# Patient Record
Sex: FEMALE
[De-identification: names, ages, dates, MRNs, and addresses within clinical notes are randomized per-mention and may not be internally consistent; named-entity substitution may affect disease eponyms.]

---

## 2022-12-07 PROBLEM — Z00.00 ENCOUNTER FOR PREVENTIVE HEALTH EXAMINATION: Status: ACTIVE | Noted: 2022-12-07

## 2022-12-15 ENCOUNTER — NON-APPOINTMENT (OUTPATIENT)
Age: 82
End: 2022-12-15

## 2022-12-15 ENCOUNTER — APPOINTMENT (OUTPATIENT)
Dept: COLORECTAL SURGERY | Facility: CLINIC | Age: 82
End: 2022-12-15

## 2022-12-15 VITALS
WEIGHT: 135 LBS | HEIGHT: 61 IN | BODY MASS INDEX: 25.49 KG/M2 | SYSTOLIC BLOOD PRESSURE: 167 MMHG | HEART RATE: 72 BPM | DIASTOLIC BLOOD PRESSURE: 76 MMHG

## 2022-12-15 DIAGNOSIS — Z82.5 FAMILY HISTORY OF ASTHMA AND OTHER CHRONIC LOWER RESPIRATORY DISEASES: ICD-10-CM

## 2022-12-15 DIAGNOSIS — Z80.3 FAMILY HISTORY OF MALIGNANT NEOPLASM OF BREAST: ICD-10-CM

## 2022-12-15 DIAGNOSIS — I10 ESSENTIAL (PRIMARY) HYPERTENSION: ICD-10-CM

## 2022-12-15 DIAGNOSIS — Z78.9 OTHER SPECIFIED HEALTH STATUS: ICD-10-CM

## 2022-12-15 PROCEDURE — 99203 OFFICE O/P NEW LOW 30 MIN: CPT | Mod: 25

## 2022-12-15 PROCEDURE — 46221 LIGATION OF HEMORRHOID(S): CPT

## 2022-12-15 RX ORDER — METHENAMINE HIPPURATE 1 G/1
TABLET ORAL
Refills: 0 | Status: ACTIVE | COMMUNITY

## 2022-12-15 RX ORDER — VIT A/VIT C/VIT E/ZINC/COPPER 4296-226
CAPSULE ORAL
Refills: 0 | Status: ACTIVE | COMMUNITY

## 2022-12-15 RX ORDER — ESTRADIOL 0.1 MG/G
CREAM VAGINAL
Refills: 0 | Status: ACTIVE | COMMUNITY

## 2022-12-15 RX ORDER — ATORVASTATIN CALCIUM 80 MG/1
TABLET, FILM COATED ORAL
Refills: 0 | Status: ACTIVE | COMMUNITY

## 2022-12-15 RX ORDER — LATANOPROST/PF 0.005 %
0.01 DROPS OPHTHALMIC (EYE)
Refills: 0 | Status: ACTIVE | COMMUNITY

## 2022-12-15 RX ORDER — DILTIAZEM HYDROCHLORIDE 300 MG/1
300 CAPSULE, EXTENDED RELEASE ORAL
Refills: 0 | Status: ACTIVE | COMMUNITY

## 2022-12-15 NOTE — CONSULT LETTER
[Dear  ___] : Dear  [unfilled], [Consult Letter:] : I had the pleasure of evaluating your patient, [unfilled]. [Please see my note below.] : Please see my note below. [Consult Closing:] : Thank you very much for allowing me to participate in the care of this patient.  If you have any questions, please do not hesitate to contact me. [Sincerely,] : Sincerely, [FreeTextEntry3] : Clarence Inman MD\par

## 2022-12-15 NOTE — HISTORY OF PRESENT ILLNESS
[FreeTextEntry1] : 81-year-old female who presents for consultation for prolapsing hemorrhoids.  She has had hemorrhoidal symptoms for several years since vaginal delivery she has been mostly asymptomatic from her hemorrhoids up until the last few months.  She noticed prolapsing rectal tissue and occasional bleeding when she wipes.  She has regular bowel movements without difficulty.  She has no rectal pain.  She has some itching in the area as well as discharge and moisture in the anal region from her hemorrhoids.  She has been suffering from recurrent urinary tract infections over the past 4 months and was seen by urologist.  Cystogram and CT scan were unremarkable for the source of her urinary tract infection but a kidney lesion was found that will be monitored.  She has had several colonoscopies most recently in 2016 which showed hemorrhoids and diverticulosis.

## 2022-12-15 NOTE — PHYSICAL EXAM
[Excoriation] : no perianal excoriation [Lax] : was lax [None] : there was no rectal mass  [Respiratory Effort] : normal respiratory effort [Normal Rate and Rhythm] : normal rate and rhythm [Calm] : calm [de-identified] : Soft, nontender, nondistended.  No mass or hernias appreciated. [de-identified] : Prolapsing internal hemorrhoids [de-identified] : Nonthrombosed external hemorrhoids [de-identified] : Well-appearing, in no distress [de-identified] : Normocephalic, atraumatic [de-identified] : Moves extremities without difficulty [de-identified] : Warm and dry [de-identified] : Alert and oriented x3

## 2022-12-15 NOTE — PROCEDURE
[FreeTextEntry1] : Risks and benefits of hemorrhoid banding was reviewed.  Anterior hemorrhoid was banded x2 above the dentate line.  She tolerated the procedure well.

## 2023-01-12 ENCOUNTER — APPOINTMENT (OUTPATIENT)
Dept: COLORECTAL SURGERY | Facility: CLINIC | Age: 83
End: 2023-01-12
Payer: MEDICARE

## 2023-01-12 VITALS
SYSTOLIC BLOOD PRESSURE: 167 MMHG | DIASTOLIC BLOOD PRESSURE: 75 MMHG | TEMPERATURE: 97 F | HEIGHT: 61 IN | WEIGHT: 135 LBS | BODY MASS INDEX: 25.49 KG/M2 | HEART RATE: 80 BPM | RESPIRATION RATE: 15 BRPM

## 2023-01-12 DIAGNOSIS — K64.8 OTHER HEMORRHOIDS: ICD-10-CM

## 2023-01-12 PROCEDURE — 46600 DIAGNOSTIC ANOSCOPY SPX: CPT

## 2023-01-12 PROCEDURE — 99212 OFFICE O/P EST SF 10 MIN: CPT | Mod: 25

## 2023-01-14 PROBLEM — K64.8 INTERNAL PROLAPSED HEMORRHOIDS: Status: ACTIVE | Noted: 2022-12-15

## 2023-01-14 NOTE — PHYSICAL EXAM
[Excoriation] : no perianal excoriation [Lax] : was lax [None] : there was no rectal mass  [Respiratory Effort] : normal respiratory effort [Calm] : calm [de-identified] : Grade 2 internal hemorrhoids [de-identified] : Nonthrombosed external hemorrhoids [de-identified] : Normocephalic, atraumatic [de-identified] : Well-appearing, in no distress [de-identified] : Moves extremities without difficulty [de-identified] : Warm and dry [de-identified] : Alert and oriented x3

## 2023-01-14 NOTE — HISTORY OF PRESENT ILLNESS
[FreeTextEntry1] : 81-year-old female who is here for follow up, patient with a history of prolapsing hemorrhoids. \par She has hemorrhoidal banding on last visit. Today, she reports having no issues, she is very satisfied with the results, states she had no more soiling, and didn't develop in UTI since.\par \par She has had several colonoscopies most recently in 2016 which showed hemorrhoids and diverticulosis. \par

## 2023-02-23 ENCOUNTER — OFFICE (OUTPATIENT)
Dept: URBAN - METROPOLITAN AREA CLINIC 109 | Facility: CLINIC | Age: 83
Setting detail: OPHTHALMOLOGY
End: 2023-02-23
Payer: MEDICARE

## 2023-02-23 DIAGNOSIS — H02.834: ICD-10-CM

## 2023-02-23 DIAGNOSIS — G43.109: ICD-10-CM

## 2023-02-23 DIAGNOSIS — H43.391: ICD-10-CM

## 2023-02-23 DIAGNOSIS — H16.221: ICD-10-CM

## 2023-02-23 DIAGNOSIS — Z96.1: ICD-10-CM

## 2023-02-23 DIAGNOSIS — H43.392: ICD-10-CM

## 2023-02-23 DIAGNOSIS — H40.1132: ICD-10-CM

## 2023-02-23 DIAGNOSIS — H02.832: ICD-10-CM

## 2023-02-23 DIAGNOSIS — H02.835: ICD-10-CM

## 2023-02-23 DIAGNOSIS — H02.831: ICD-10-CM

## 2023-02-23 DIAGNOSIS — H35.3131: ICD-10-CM

## 2023-02-23 PROCEDURE — 92014 COMPRE OPH EXAM EST PT 1/>: CPT | Performed by: OPHTHALMOLOGY

## 2023-02-23 PROCEDURE — 92020 GONIOSCOPY: CPT | Performed by: OPHTHALMOLOGY

## 2023-02-23 PROCEDURE — 92133 CPTRZD OPH DX IMG PST SGM ON: CPT | Performed by: OPHTHALMOLOGY

## 2023-02-23 PROCEDURE — 92250 FUNDUS PHOTOGRAPHY W/I&R: CPT | Performed by: OPHTHALMOLOGY

## 2023-02-23 ASSESSMENT — REFRACTION_AUTOREFRACTION
OD_SPHERE: +0.25
OS_SPHERE: -0.25
OD_AXIS: 117
OD_CYLINDER: -1.00

## 2023-02-23 ASSESSMENT — REFRACTION_MANIFEST
OS_CYLINDER: -0.50
OD_SPHERE: +2.50
OS_AXIS: 85
OD_VA1: 20/30+2
OS_AXIS: 165
OD_AXIS: 96
OD_SPHERE: -4.75
OS_VA1: 20/30+2
OS_SPHERE: +2.75
OD_CYLINDER: -0.50
OS_CYLINDER: -1.25
OS_SPHERE: -4.75

## 2023-02-23 ASSESSMENT — KERATOMETRY
OD_K2POWER_DIOPTERS: 45.75
OS_K1POWER_DIOPTERS: 45.50
OS_AXISANGLE_DEGREES: 6
OS_K2POWER_DIOPTERS: 45.87
OD_AXISANGLE_DEGREES: 106
OD_K1POWER_DIOPTERS: 45.37

## 2023-02-23 ASSESSMENT — MACULA - RETINAL PIGMENT EPITHELIAL CHANGES
OS_RPE_CHANGES: T
OD_RPE_CHANGES: T

## 2023-02-23 ASSESSMENT — CONFRONTATIONAL VISUAL FIELD TEST (CVF)
OS_FINDINGS: FULL
OD_FINDINGS: FULL

## 2023-02-23 ASSESSMENT — SPHEQUIV_DERIVED
OS_SPHEQUIV: -5.375
OD_SPHEQUIV: -0.25
OD_SPHEQUIV: 2.25
OS_SPHEQUIV: 2.5

## 2023-02-23 ASSESSMENT — LID POSITION - DERMATOCHALASIS
OS_DERMATOCHALASIS: 1+ 2+
OD_DERMATOCHALASIS: 1+ 2+

## 2023-02-23 ASSESSMENT — REFRACTION_CURRENTRX
OS_AXIS: 165
OS_CYLINDER: -1.25
OD_VPRISM_DIRECTION: SV
OD_OVR_VA: 20/
OS_SPHERE: -3.25
OS_VPRISM_DIRECTION: SV
OS_OVR_VA: 20/
OD_OVR_VA: 20/
OD_SPHERE: -4.25
OS_OVR_VA: 20/

## 2023-02-23 ASSESSMENT — AXIALLENGTH_DERIVED
OS_AL: 21.94
OD_AL: 22.0644
OS_AL: 24.96
OD_AL: 22.9506

## 2023-02-23 ASSESSMENT — VISUAL ACUITY
OD_BCVA: 20/25+3
OS_BCVA: 20/25-2

## 2023-02-23 ASSESSMENT — SUPERFICIAL PUNCTATE KERATITIS (SPK)
OD_SPK: ABSENT
OS_SPK: ABSENT

## 2023-02-23 ASSESSMENT — MACULA - DRUSEN
OD_DRUSEN: 2+
OS_DRUSEN: 2+

## 2023-06-21 ENCOUNTER — OFFICE (OUTPATIENT)
Dept: URBAN - METROPOLITAN AREA CLINIC 109 | Facility: CLINIC | Age: 83
Setting detail: OPHTHALMOLOGY
End: 2023-06-21
Payer: MEDICARE

## 2023-06-21 DIAGNOSIS — H02.831: ICD-10-CM

## 2023-06-21 DIAGNOSIS — H02.834: ICD-10-CM

## 2023-06-21 DIAGNOSIS — Z96.1: ICD-10-CM

## 2023-06-21 DIAGNOSIS — H43.392: ICD-10-CM

## 2023-06-21 DIAGNOSIS — H02.835: ICD-10-CM

## 2023-06-21 DIAGNOSIS — H43.391: ICD-10-CM

## 2023-06-21 DIAGNOSIS — H35.3131: ICD-10-CM

## 2023-06-21 DIAGNOSIS — H16.221: ICD-10-CM

## 2023-06-21 DIAGNOSIS — H40.1132: ICD-10-CM

## 2023-06-21 DIAGNOSIS — H02.832: ICD-10-CM

## 2023-06-21 DIAGNOSIS — G43.109: ICD-10-CM

## 2023-06-21 PROCEDURE — 92083 EXTENDED VISUAL FIELD XM: CPT | Performed by: OPHTHALMOLOGY

## 2023-06-21 PROCEDURE — 99213 OFFICE O/P EST LOW 20 MIN: CPT | Performed by: OPHTHALMOLOGY

## 2023-06-21 ASSESSMENT — REFRACTION_MANIFEST
OD_CYLINDER: -0.50
OS_AXIS: 165
OS_SPHERE: -4.75
OD_AXIS: 96
OD_VA1: 20/30+2
OD_SPHERE: +2.50
OS_AXIS: 85
OS_SPHERE: +2.75
OS_VA1: 20/30+2
OD_SPHERE: -4.75
OS_CYLINDER: -1.25
OS_CYLINDER: -0.50

## 2023-06-21 ASSESSMENT — SUPERFICIAL PUNCTATE KERATITIS (SPK)
OS_SPK: ABSENT
OD_SPK: ABSENT

## 2023-06-21 ASSESSMENT — REFRACTION_CURRENTRX
OS_OVR_VA: 20/
OS_VPRISM_DIRECTION: SV
OD_OVR_VA: 20/
OS_SPHERE: -3.25
OD_OVR_VA: 20/
OS_AXIS: 165
OS_OVR_VA: 20/
OD_VPRISM_DIRECTION: SV
OD_SPHERE: -4.25
OS_CYLINDER: -1.25

## 2023-06-21 ASSESSMENT — KERATOMETRY
OD_K2POWER_DIOPTERS: 45.75
OD_K1POWER_DIOPTERS: 45.37
OD_AXISANGLE_DEGREES: 106
OS_AXISANGLE_DEGREES: 6
OS_K2POWER_DIOPTERS: 45.87
OS_K1POWER_DIOPTERS: 45.50

## 2023-06-21 ASSESSMENT — MACULA - RETINAL PIGMENT EPITHELIAL CHANGES
OS_RPE_CHANGES: T
OD_RPE_CHANGES: T

## 2023-06-21 ASSESSMENT — AXIALLENGTH_DERIVED
OS_AL: 24.96
OS_AL: 21.94
OD_AL: 22.0644
OD_AL: 22.9506

## 2023-06-21 ASSESSMENT — PACHYMETRY
OD_CT_CORRECTION: 1
OS_CT_UM: 527
OS_CT_CORRECTION: 1
OD_CT_UM: 532

## 2023-06-21 ASSESSMENT — VISUAL ACUITY
OS_BCVA: 20/25
OD_BCVA: 20/25-2

## 2023-06-21 ASSESSMENT — REFRACTION_AUTOREFRACTION
OD_SPHERE: +0.25
OD_AXIS: 117
OD_CYLINDER: -1.00
OS_SPHERE: -0.25

## 2023-06-21 ASSESSMENT — SPHEQUIV_DERIVED
OS_SPHEQUIV: -5.375
OS_SPHEQUIV: 2.5
OD_SPHEQUIV: -0.25
OD_SPHEQUIV: 2.25

## 2023-06-21 ASSESSMENT — CONFRONTATIONAL VISUAL FIELD TEST (CVF)
OD_FINDINGS: FULL
OS_FINDINGS: FULL

## 2023-06-21 ASSESSMENT — MACULA - DRUSEN
OS_DRUSEN: 2+
OD_DRUSEN: 2+

## 2023-06-21 ASSESSMENT — TONOMETRY
OS_IOP_MMHG: 18
OD_IOP_MMHG: 18

## 2023-06-21 ASSESSMENT — LID POSITION - DERMATOCHALASIS
OD_DERMATOCHALASIS: 1+ 2+
OS_DERMATOCHALASIS: 1+ 2+

## 2023-07-12 ENCOUNTER — OFFICE (OUTPATIENT)
Dept: URBAN - METROPOLITAN AREA CLINIC 109 | Facility: CLINIC | Age: 83
Setting detail: OPHTHALMOLOGY
End: 2023-07-12
Payer: MEDICARE

## 2023-07-12 DIAGNOSIS — H43.391: ICD-10-CM

## 2023-07-12 DIAGNOSIS — H26.491: ICD-10-CM

## 2023-07-12 DIAGNOSIS — H43.392: ICD-10-CM

## 2023-07-12 DIAGNOSIS — H02.831: ICD-10-CM

## 2023-07-12 DIAGNOSIS — H53.19: ICD-10-CM

## 2023-07-12 DIAGNOSIS — H02.835: ICD-10-CM

## 2023-07-12 DIAGNOSIS — H35.3131: ICD-10-CM

## 2023-07-12 DIAGNOSIS — G43.109: ICD-10-CM

## 2023-07-12 DIAGNOSIS — H40.1132: ICD-10-CM

## 2023-07-12 DIAGNOSIS — H02.832: ICD-10-CM

## 2023-07-12 DIAGNOSIS — H02.834: ICD-10-CM

## 2023-07-12 DIAGNOSIS — H16.221: ICD-10-CM

## 2023-07-12 PROCEDURE — 99213 OFFICE O/P EST LOW 20 MIN: CPT | Performed by: OPHTHALMOLOGY

## 2023-07-12 PROCEDURE — 92134 CPTRZ OPH DX IMG PST SGM RTA: CPT | Performed by: OPHTHALMOLOGY

## 2023-07-12 ASSESSMENT — REFRACTION_MANIFEST
OS_AXIS: 85
OS_CYLINDER: -1.25
OS_AXIS: 165
OD_AXIS: 96
OS_SPHERE: -4.75
OD_CYLINDER: -0.50
OS_CYLINDER: -0.50
OD_VA1: 20/30+2
OS_VA1: 20/30+2
OD_SPHERE: -4.75
OD_SPHERE: +2.50
OS_SPHERE: +2.75

## 2023-07-12 ASSESSMENT — KERATOMETRY
OD_AXISANGLE_DEGREES: 106
OD_K2POWER_DIOPTERS: 45.75
OD_K1POWER_DIOPTERS: 45.37
OS_K2POWER_DIOPTERS: 45.87
OS_K1POWER_DIOPTERS: 45.50
OS_AXISANGLE_DEGREES: 6

## 2023-07-12 ASSESSMENT — VISUAL ACUITY
OD_BCVA: 20/25-1
OS_BCVA: 20/25

## 2023-07-12 ASSESSMENT — AXIALLENGTH_DERIVED
OD_AL: 22.9506
OS_AL: 21.94
OD_AL: 22.0644
OS_AL: 24.96

## 2023-07-12 ASSESSMENT — TONOMETRY
OS_IOP_MMHG: 15
OD_IOP_MMHG: 13

## 2023-07-12 ASSESSMENT — SUPERFICIAL PUNCTATE KERATITIS (SPK)
OS_SPK: ABSENT
OD_SPK: ABSENT

## 2023-07-12 ASSESSMENT — PACHYMETRY
OS_CT_CORRECTION: 1
OD_CT_UM: 532
OS_CT_UM: 527
OD_CT_CORRECTION: 1

## 2023-07-12 ASSESSMENT — REFRACTION_CURRENTRX
OS_OVR_VA: 20/
OD_SPHERE: -4.25
OD_OVR_VA: 20/
OS_VPRISM_DIRECTION: SV
OD_VPRISM_DIRECTION: SV
OS_CYLINDER: -1.25
OS_AXIS: 165
OS_OVR_VA: 20/
OD_OVR_VA: 20/
OS_SPHERE: -3.25

## 2023-07-12 ASSESSMENT — SPHEQUIV_DERIVED
OS_SPHEQUIV: 2.5
OD_SPHEQUIV: -0.25
OS_SPHEQUIV: -5.375
OD_SPHEQUIV: 2.25

## 2023-07-12 ASSESSMENT — CONFRONTATIONAL VISUAL FIELD TEST (CVF)
OD_FINDINGS: FULL
OS_FINDINGS: FULL

## 2023-07-12 ASSESSMENT — REFRACTION_AUTOREFRACTION
OD_SPHERE: +0.25
OD_AXIS: 117
OD_CYLINDER: -1.00
OS_SPHERE: -0.25

## 2023-07-12 ASSESSMENT — MACULA - DRUSEN
OS_DRUSEN: 2+
OD_DRUSEN: 2+

## 2023-07-12 ASSESSMENT — LID POSITION - DERMATOCHALASIS
OD_DERMATOCHALASIS: 1+ 2+
OS_DERMATOCHALASIS: 1+ 2+

## 2023-07-12 ASSESSMENT — MACULA - RETINAL PIGMENT EPITHELIAL CHANGES
OS_RPE_CHANGES: T
OD_RPE_CHANGES: T

## 2023-07-20 ENCOUNTER — OFFICE (OUTPATIENT)
Dept: URBAN - METROPOLITAN AREA CLINIC 109 | Facility: CLINIC | Age: 83
Setting detail: OPHTHALMOLOGY
End: 2023-07-20
Payer: MEDICARE

## 2023-07-20 VITALS — HEIGHT: 55 IN

## 2023-07-20 DIAGNOSIS — H26.491: ICD-10-CM

## 2023-07-20 DIAGNOSIS — H16.221: ICD-10-CM

## 2023-07-20 DIAGNOSIS — H43.391: ICD-10-CM

## 2023-07-20 DIAGNOSIS — H02.831: ICD-10-CM

## 2023-07-20 DIAGNOSIS — H02.835: ICD-10-CM

## 2023-07-20 DIAGNOSIS — H40.1132: ICD-10-CM

## 2023-07-20 DIAGNOSIS — H02.834: ICD-10-CM

## 2023-07-20 DIAGNOSIS — H35.3131: ICD-10-CM

## 2023-07-20 DIAGNOSIS — H02.832: ICD-10-CM

## 2023-07-20 DIAGNOSIS — G43.109: ICD-10-CM

## 2023-07-20 DIAGNOSIS — H53.19: ICD-10-CM

## 2023-07-20 DIAGNOSIS — H43.392: ICD-10-CM

## 2023-07-20 PROCEDURE — 99213 OFFICE O/P EST LOW 20 MIN: CPT | Performed by: OPHTHALMOLOGY

## 2023-07-20 PROCEDURE — 92083 EXTENDED VISUAL FIELD XM: CPT | Performed by: OPHTHALMOLOGY

## 2023-07-20 ASSESSMENT — PACHYMETRY
OD_CT_CORRECTION: 1
OS_CT_UM: 527
OS_CT_CORRECTION: 1
OD_CT_UM: 532

## 2023-07-20 ASSESSMENT — REFRACTION_AUTOREFRACTION
OD_SPHERE: +0.25
OD_AXIS: 117
OS_SPHERE: -0.25
OD_CYLINDER: -1.00

## 2023-07-20 ASSESSMENT — VISUAL ACUITY
OD_BCVA: 20/30+2
OS_BCVA: 20/30+1

## 2023-07-20 ASSESSMENT — REFRACTION_CURRENTRX
OD_OVR_VA: 20/
OS_OVR_VA: 20/
OS_SPHERE: -3.25
OS_AXIS: 165
OD_OVR_VA: 20/
OS_CYLINDER: -1.25
OD_VPRISM_DIRECTION: SV
OS_VPRISM_DIRECTION: SV
OS_OVR_VA: 20/
OD_SPHERE: -4.25

## 2023-07-20 ASSESSMENT — REFRACTION_MANIFEST
OS_AXIS: 165
OS_VA1: 20/30+2
OS_CYLINDER: -0.50
OS_AXIS: 85
OS_SPHERE: -4.75
OD_SPHERE: +2.50
OS_SPHERE: +2.75
OS_CYLINDER: -1.25
OD_AXIS: 96
OD_SPHERE: -4.75
OD_VA1: 20/30+2
OD_CYLINDER: -0.50

## 2023-07-20 ASSESSMENT — SPHEQUIV_DERIVED
OS_SPHEQUIV: 2.5
OD_SPHEQUIV: -0.25
OD_SPHEQUIV: 2.25
OS_SPHEQUIV: -5.375

## 2023-07-20 ASSESSMENT — KERATOMETRY
OS_K1POWER_DIOPTERS: 45.50
OS_AXISANGLE_DEGREES: 6
OD_K1POWER_DIOPTERS: 45.37
OD_AXISANGLE_DEGREES: 106
OS_K2POWER_DIOPTERS: 45.87
OD_K2POWER_DIOPTERS: 45.75

## 2023-07-20 ASSESSMENT — LID POSITION - DERMATOCHALASIS
OD_DERMATOCHALASIS: 1+ 2+
OS_DERMATOCHALASIS: 1+ 2+

## 2023-07-20 ASSESSMENT — CONFRONTATIONAL VISUAL FIELD TEST (CVF)
OS_FINDINGS: FULL
OD_FINDINGS: FULL

## 2023-07-20 ASSESSMENT — TONOMETRY
OS_IOP_MMHG: 19
OD_IOP_MMHG: 19

## 2023-07-20 ASSESSMENT — SUPERFICIAL PUNCTATE KERATITIS (SPK)
OD_SPK: ABSENT
OS_SPK: ABSENT

## 2023-07-20 ASSESSMENT — AXIALLENGTH_DERIVED
OS_AL: 24.96
OS_AL: 21.94
OD_AL: 22.0644
OD_AL: 22.9506

## 2023-07-20 ASSESSMENT — MACULA - RETINAL PIGMENT EPITHELIAL CHANGES
OS_RPE_CHANGES: T
OD_RPE_CHANGES: T

## 2023-07-20 ASSESSMENT — MACULA - DRUSEN
OD_DRUSEN: 2+
OS_DRUSEN: 2+

## 2023-11-08 ENCOUNTER — OFFICE (OUTPATIENT)
Dept: URBAN - METROPOLITAN AREA CLINIC 109 | Facility: CLINIC | Age: 83
Setting detail: OPHTHALMOLOGY
End: 2023-11-08
Payer: MEDICARE

## 2023-11-08 DIAGNOSIS — H35.3131: ICD-10-CM

## 2023-11-08 DIAGNOSIS — H16.221: ICD-10-CM

## 2023-11-08 DIAGNOSIS — H02.831: ICD-10-CM

## 2023-11-08 DIAGNOSIS — G43.109: ICD-10-CM

## 2023-11-08 DIAGNOSIS — H43.393: ICD-10-CM

## 2023-11-08 DIAGNOSIS — H02.832: ICD-10-CM

## 2023-11-08 DIAGNOSIS — H40.1132: ICD-10-CM

## 2023-11-08 DIAGNOSIS — H02.834: ICD-10-CM

## 2023-11-08 DIAGNOSIS — H26.491: ICD-10-CM

## 2023-11-08 DIAGNOSIS — H02.835: ICD-10-CM

## 2023-11-08 PROCEDURE — 92133 CPTRZD OPH DX IMG PST SGM ON: CPT | Performed by: OPHTHALMOLOGY

## 2023-11-08 PROCEDURE — 99213 OFFICE O/P EST LOW 20 MIN: CPT | Performed by: OPHTHALMOLOGY

## 2023-11-08 ASSESSMENT — REFRACTION_AUTOREFRACTION
OS_SPHERE: -0.25
OD_SPHERE: +0.25
OD_CYLINDER: -1.00
OD_AXIS: 117

## 2023-11-08 ASSESSMENT — MACULA - RETINAL PIGMENT EPITHELIAL CHANGES
OD_RPE_CHANGES: T
OS_RPE_CHANGES: T

## 2023-11-08 ASSESSMENT — REFRACTION_CURRENTRX
OD_OVR_VA: 20/
OD_OVR_VA: 20/
OD_SPHERE: -4.25
OD_VPRISM_DIRECTION: SV
OS_OVR_VA: 20/
OS_CYLINDER: -1.25
OS_AXIS: 165
OS_VPRISM_DIRECTION: SV
OS_OVR_VA: 20/
OS_SPHERE: -3.25

## 2023-11-08 ASSESSMENT — SPHEQUIV_DERIVED
OS_SPHEQUIV: 2.5
OD_SPHEQUIV: 2.25
OD_SPHEQUIV: -0.25

## 2023-11-08 ASSESSMENT — REFRACTION_MANIFEST
OD_SPHERE: +2.50
OD_VA1: 20/20-2
OS_AXIS: 85
OS_CYLINDER: -0.50
OD_CYLINDER: -0.50
OS_SPHERE: -0.50
OD_SPHERE: -0.50
OS_VA1: 20/30+
OS_SPHERE: +2.75
OD_AXIS: 96

## 2023-11-08 ASSESSMENT — LID POSITION - DERMATOCHALASIS
OS_DERMATOCHALASIS: 1+ 2+
OD_DERMATOCHALASIS: 1+ 2+

## 2023-11-08 ASSESSMENT — MACULA - DRUSEN
OS_DRUSEN: 2+
OD_DRUSEN: 2+

## 2023-11-08 ASSESSMENT — SUPERFICIAL PUNCTATE KERATITIS (SPK)
OD_SPK: ABSENT
OS_SPK: ABSENT

## 2023-11-08 ASSESSMENT — CONFRONTATIONAL VISUAL FIELD TEST (CVF)
OD_FINDINGS: FULL
OS_FINDINGS: FULL

## 2024-03-13 ENCOUNTER — OFFICE (OUTPATIENT)
Dept: URBAN - METROPOLITAN AREA CLINIC 109 | Facility: CLINIC | Age: 84
Setting detail: OPHTHALMOLOGY
End: 2024-03-13
Payer: MEDICARE

## 2024-03-13 VITALS — HEIGHT: 55 IN

## 2024-03-13 DIAGNOSIS — H26.491: ICD-10-CM

## 2024-03-13 DIAGNOSIS — H02.832: ICD-10-CM

## 2024-03-13 DIAGNOSIS — H40.1132: ICD-10-CM

## 2024-03-13 DIAGNOSIS — H35.3131: ICD-10-CM

## 2024-03-13 DIAGNOSIS — H16.221: ICD-10-CM

## 2024-03-13 DIAGNOSIS — H02.835: ICD-10-CM

## 2024-03-13 DIAGNOSIS — H02.834: ICD-10-CM

## 2024-03-13 DIAGNOSIS — H43.393: ICD-10-CM

## 2024-03-13 DIAGNOSIS — G43.109: ICD-10-CM

## 2024-03-13 DIAGNOSIS — H02.831: ICD-10-CM

## 2024-03-13 PROCEDURE — 99213 OFFICE O/P EST LOW 20 MIN: CPT | Performed by: OPHTHALMOLOGY

## 2024-03-13 PROCEDURE — 92083 EXTENDED VISUAL FIELD XM: CPT | Performed by: OPHTHALMOLOGY

## 2024-03-13 PROCEDURE — 92250 FUNDUS PHOTOGRAPHY W/I&R: CPT | Performed by: OPHTHALMOLOGY

## 2024-03-13 ASSESSMENT — REFRACTION_CURRENTRX
OD_OVR_VA: 20/
OS_VPRISM_DIRECTION: SV
OS_OVR_VA: 20/
OS_SPHERE: -3.25
OD_OVR_VA: 20/
OS_CYLINDER: -1.25
OD_VPRISM_DIRECTION: SV
OD_SPHERE: -4.25
OS_OVR_VA: 20/
OS_AXIS: 165

## 2024-03-13 ASSESSMENT — REFRACTION_MANIFEST
OD_SPHERE: +2.50
OS_AXIS: 85
OD_SPHERE: -0.50
OS_SPHERE: +2.75
OD_CYLINDER: -0.50
OS_VA1: 20/30+
OS_CYLINDER: -0.50
OS_SPHERE: -0.50
OD_VA1: 20/20-2
OD_AXIS: 96

## 2024-03-13 ASSESSMENT — SPHEQUIV_DERIVED
OD_SPHEQUIV: 2.25
OS_SPHEQUIV: 2.5

## 2024-07-24 ENCOUNTER — OFFICE (OUTPATIENT)
Dept: URBAN - METROPOLITAN AREA CLINIC 109 | Facility: CLINIC | Age: 84
Setting detail: OPHTHALMOLOGY
End: 2024-07-24
Payer: MEDICARE

## 2024-07-24 DIAGNOSIS — H02.832: ICD-10-CM

## 2024-07-24 DIAGNOSIS — H02.831: ICD-10-CM

## 2024-07-24 DIAGNOSIS — H40.1132: ICD-10-CM

## 2024-07-24 DIAGNOSIS — H43.393: ICD-10-CM

## 2024-07-24 DIAGNOSIS — H35.3131: ICD-10-CM

## 2024-07-24 DIAGNOSIS — H02.835: ICD-10-CM

## 2024-07-24 DIAGNOSIS — G43.109: ICD-10-CM

## 2024-07-24 DIAGNOSIS — H02.834: ICD-10-CM

## 2024-07-24 DIAGNOSIS — H16.221: ICD-10-CM

## 2024-07-24 DIAGNOSIS — H26.491: ICD-10-CM

## 2024-07-24 PROCEDURE — 92083 EXTENDED VISUAL FIELD XM: CPT | Performed by: OPHTHALMOLOGY

## 2024-07-24 PROCEDURE — 92133 CPTRZD OPH DX IMG PST SGM ON: CPT | Performed by: OPHTHALMOLOGY

## 2024-07-24 PROCEDURE — 99213 OFFICE O/P EST LOW 20 MIN: CPT | Performed by: OPHTHALMOLOGY

## 2024-07-24 PROCEDURE — 92020 GONIOSCOPY: CPT | Performed by: OPHTHALMOLOGY

## 2024-07-24 ASSESSMENT — LID POSITION - DERMATOCHALASIS
OD_DERMATOCHALASIS: 1+ 2+
OS_DERMATOCHALASIS: 1+ 2+

## 2024-07-24 ASSESSMENT — CONFRONTATIONAL VISUAL FIELD TEST (CVF)
OD_FINDINGS: FULL
OS_FINDINGS: FULL

## 2024-07-24 ASSESSMENT — MACULA - DRUSEN
OD_DRUSEN: 2+
OS_DRUSEN: 2+

## 2024-07-24 ASSESSMENT — MACULA - RETINAL PIGMENT EPITHELIAL CHANGES
OD_RPE_CHANGES: T
OS_RPE_CHANGES: T

## 2024-11-19 ENCOUNTER — OFFICE (OUTPATIENT)
Dept: URBAN - METROPOLITAN AREA CLINIC 109 | Facility: CLINIC | Age: 84
Setting detail: OPHTHALMOLOGY
End: 2024-11-19
Payer: MEDICARE

## 2024-11-19 DIAGNOSIS — H40.1132: ICD-10-CM

## 2024-11-19 DIAGNOSIS — H26.491: ICD-10-CM

## 2024-11-19 DIAGNOSIS — H16.221: ICD-10-CM

## 2024-11-19 DIAGNOSIS — H02.834: ICD-10-CM

## 2024-11-19 DIAGNOSIS — H35.3131: ICD-10-CM

## 2024-11-19 DIAGNOSIS — H02.832: ICD-10-CM

## 2024-11-19 DIAGNOSIS — G43.109: ICD-10-CM

## 2024-11-19 DIAGNOSIS — H02.831: ICD-10-CM

## 2024-11-19 DIAGNOSIS — H43.393: ICD-10-CM

## 2024-11-19 DIAGNOSIS — H02.835: ICD-10-CM

## 2024-11-19 PROCEDURE — 92134 CPTRZ OPH DX IMG PST SGM RTA: CPT | Performed by: OPHTHALMOLOGY

## 2024-11-19 PROCEDURE — 99213 OFFICE O/P EST LOW 20 MIN: CPT | Performed by: OPHTHALMOLOGY

## 2024-11-19 ASSESSMENT — SUPERFICIAL PUNCTATE KERATITIS (SPK)
OS_SPK: ABSENT
OD_SPK: T

## 2024-11-19 ASSESSMENT — PACHYMETRY
OS_CT_CORRECTION: 1
OS_CT_UM: 527
OD_CT_UM: 532
OD_CT_CORRECTION: 1

## 2024-11-19 ASSESSMENT — MACULA - RETINAL PIGMENT EPITHELIAL CHANGES
OS_RPE_CHANGES: T
OD_RPE_CHANGES: T

## 2024-11-19 ASSESSMENT — REFRACTION_AUTOREFRACTION
OS_SPHERE: -0.25
OD_AXIS: 104
OS_AXIS: 93
OD_CYLINDER: -1.25
OD_SPHERE: +0.25
OS_CYLINDER: -1.00

## 2024-11-19 ASSESSMENT — REFRACTION_CURRENTRX
OD_OVR_VA: 20/
OS_OVR_VA: 20/
OS_VPRISM_DIRECTION: SV
OD_OVR_VA: 20/
OS_AXIS: 165
OD_VPRISM_DIRECTION: SV
OS_OVR_VA: 20/
OS_CYLINDER: -1.25
OS_SPHERE: -3.25
OD_SPHERE: -4.25

## 2024-11-19 ASSESSMENT — LID POSITION - DERMATOCHALASIS
OD_DERMATOCHALASIS: 1+ 2+
OS_DERMATOCHALASIS: 1+ 2+

## 2024-11-19 ASSESSMENT — KERATOMETRY
OS_AXISANGLE_DEGREES: 6
OD_K1POWER_DIOPTERS: 45.37
OS_K1POWER_DIOPTERS: 45.50
OS_K2POWER_DIOPTERS: 45.87
OD_K2POWER_DIOPTERS: 45.75
OD_AXISANGLE_DEGREES: 106

## 2024-11-19 ASSESSMENT — REFRACTION_MANIFEST
OS_AXIS: 85
OS_CYLINDER: -0.75
OD_AXIS: 96
OD_CYLINDER: -1.25
OS_AXIS: 85
OD_CYLINDER: -0.50
OS_CYLINDER: -0.50
OD_VA1: 20/25-2
OD_AXIS: 115
OS_VA1: 20/25+
OS_SPHERE: +2.75
OD_SPHERE: -0.25
OS_SPHERE: +0.25
OD_SPHERE: +2.50

## 2024-11-19 ASSESSMENT — CONFRONTATIONAL VISUAL FIELD TEST (CVF)
OS_FINDINGS: FULL
OD_FINDINGS: FULL

## 2024-11-19 ASSESSMENT — VISUAL ACUITY
OD_BCVA: 20/30
OS_BCVA: 20/40

## 2024-11-19 ASSESSMENT — MACULA - DRUSEN
OD_DRUSEN: 2+
OS_DRUSEN: 2+

## 2024-11-19 ASSESSMENT — TONOMETRY
OS_IOP_MMHG: 16
OD_IOP_MMHG: 15

## 2024-11-25 ENCOUNTER — DOCTOR'S OFFICE (OUTPATIENT)
Facility: LOCATION | Age: 84
Setting detail: OPHTHALMOLOGY
End: 2024-11-25
Payer: MEDICARE

## 2024-11-25 DIAGNOSIS — H43.393: ICD-10-CM

## 2024-11-25 DIAGNOSIS — H43.813: ICD-10-CM

## 2024-11-25 DIAGNOSIS — H35.3131: ICD-10-CM

## 2024-11-25 DIAGNOSIS — H35.342: ICD-10-CM

## 2024-11-25 DIAGNOSIS — H35.373: ICD-10-CM

## 2024-11-25 PROCEDURE — 99214 OFFICE O/P EST MOD 30 MIN: CPT | Performed by: OPHTHALMOLOGY

## 2024-11-25 PROCEDURE — 92201 OPSCPY EXTND RTA DRAW UNI/BI: CPT | Performed by: OPHTHALMOLOGY

## 2024-11-25 PROCEDURE — 92134 CPTRZ OPH DX IMG PST SGM RTA: CPT | Performed by: OPHTHALMOLOGY

## 2024-11-25 ASSESSMENT — KERATOMETRY
OD_K2POWER_DIOPTERS: 45.75
OD_AXISANGLE_DEGREES: 106
OS_AXISANGLE_DEGREES: 6
OD_K1POWER_DIOPTERS: 45.37
OS_K1POWER_DIOPTERS: 45.50
OS_K2POWER_DIOPTERS: 45.87

## 2024-11-25 ASSESSMENT — REFRACTION_AUTOREFRACTION
OS_SPHERE: -0.25
OS_CYLINDER: -1.00
OS_AXIS: 93
OD_CYLINDER: -1.25
OD_AXIS: 104
OD_SPHERE: +0.25

## 2024-11-25 ASSESSMENT — VISUAL ACUITY
OD_BCVA: 20/30
OS_BCVA: 20/40

## 2024-11-25 ASSESSMENT — MACULA - DRUSEN
OD_DRUSEN: 2+
OS_DRUSEN: 2+

## 2024-11-25 ASSESSMENT — SUPERFICIAL PUNCTATE KERATITIS (SPK)
OD_SPK: T
OS_SPK: ABSENT

## 2024-11-25 ASSESSMENT — LID POSITION - DERMATOCHALASIS
OS_DERMATOCHALASIS: 1+ 2+
OD_DERMATOCHALASIS: 1+ 2+

## 2024-11-25 ASSESSMENT — MACULA - RETINAL PIGMENT EPITHELIAL CHANGES
OS_RPE_CHANGES: T
OD_RPE_CHANGES: T

## 2025-01-23 ENCOUNTER — DOCTOR'S OFFICE (OUTPATIENT)
Facility: LOCATION | Age: 85
Setting detail: OPHTHALMOLOGY
End: 2025-01-23
Payer: MEDICARE

## 2025-01-23 DIAGNOSIS — H35.3131: ICD-10-CM

## 2025-01-23 DIAGNOSIS — H35.373: ICD-10-CM

## 2025-01-23 DIAGNOSIS — H20.00: ICD-10-CM

## 2025-01-23 DIAGNOSIS — H43.813: ICD-10-CM

## 2025-01-23 DIAGNOSIS — H35.363: ICD-10-CM

## 2025-01-23 DIAGNOSIS — H35.342: ICD-10-CM

## 2025-01-23 DIAGNOSIS — H43.393: ICD-10-CM

## 2025-01-23 PROCEDURE — 92134 CPTRZ OPH DX IMG PST SGM RTA: CPT | Performed by: OPHTHALMOLOGY

## 2025-01-23 PROCEDURE — 99213 OFFICE O/P EST LOW 20 MIN: CPT | Performed by: OPHTHALMOLOGY

## 2025-01-23 PROCEDURE — 92235 FLUORESCEIN ANGRPH MLTIFRAME: CPT | Performed by: OPHTHALMOLOGY

## 2025-01-23 ASSESSMENT — KERATOMETRY
OS_AXISANGLE_DEGREES: 6
OD_K1POWER_DIOPTERS: 45.37
OS_K2POWER_DIOPTERS: 45.87
OD_K2POWER_DIOPTERS: 45.75
OS_K1POWER_DIOPTERS: 45.50
OD_AXISANGLE_DEGREES: 106

## 2025-01-23 ASSESSMENT — MACULA - RETINAL PIGMENT EPITHELIAL CHANGES
OS_RPE_CHANGES: T
OD_RPE_CHANGES: T

## 2025-01-23 ASSESSMENT — VISUAL ACUITY
OD_BCVA: 20/25+2
OS_BCVA: 20/20-3

## 2025-01-23 ASSESSMENT — LID POSITION - DERMATOCHALASIS
OS_DERMATOCHALASIS: 1+ 2+
OD_DERMATOCHALASIS: 1+ 2+

## 2025-01-23 ASSESSMENT — MACULA - DRUSEN
OS_DRUSEN: 2+
OD_DRUSEN: 2+

## 2025-01-23 ASSESSMENT — REFRACTION_AUTOREFRACTION
OD_AXIS: 104
OS_AXIS: 93
OD_CYLINDER: -1.25
OD_SPHERE: +0.25
OS_SPHERE: -0.25
OS_CYLINDER: -1.00

## 2025-01-23 ASSESSMENT — SUPERFICIAL PUNCTATE KERATITIS (SPK)
OS_SPK: ABSENT
OD_SPK: T

## 2025-02-27 ENCOUNTER — RX ONLY (RX ONLY)
Age: 85
End: 2025-02-27

## 2025-02-27 ENCOUNTER — DOCTOR'S OFFICE (OUTPATIENT)
Facility: LOCATION | Age: 85
Setting detail: OPHTHALMOLOGY
End: 2025-02-27
Payer: MEDICARE

## 2025-02-27 VITALS — HEIGHT: 55 IN

## 2025-02-27 DIAGNOSIS — H20.00: ICD-10-CM

## 2025-02-27 DIAGNOSIS — H43.393: ICD-10-CM

## 2025-02-27 DIAGNOSIS — H35.373: ICD-10-CM

## 2025-02-27 DIAGNOSIS — H35.363: ICD-10-CM

## 2025-02-27 DIAGNOSIS — H35.3131: ICD-10-CM

## 2025-02-27 DIAGNOSIS — H35.342: ICD-10-CM

## 2025-02-27 DIAGNOSIS — H43.813: ICD-10-CM

## 2025-02-27 PROCEDURE — 92134 CPTRZ OPH DX IMG PST SGM RTA: CPT | Performed by: OPHTHALMOLOGY

## 2025-02-27 PROCEDURE — 99213 OFFICE O/P EST LOW 20 MIN: CPT | Performed by: OPHTHALMOLOGY

## 2025-02-27 ASSESSMENT — CONFRONTATIONAL VISUAL FIELD TEST (CVF)
OD_FINDINGS: FULL
OS_FINDINGS: FULL

## 2025-02-27 ASSESSMENT — LID POSITION - DERMATOCHALASIS
OS_DERMATOCHALASIS: 1+ 2+
OD_DERMATOCHALASIS: 1+ 2+

## 2025-02-27 ASSESSMENT — SUPERFICIAL PUNCTATE KERATITIS (SPK)
OD_SPK: T
OS_SPK: ABSENT

## 2025-02-27 ASSESSMENT — REFRACTION_AUTOREFRACTION
OS_AXIS: 93
OD_AXIS: 104
OD_SPHERE: +0.25
OD_CYLINDER: -1.25
OS_SPHERE: -0.25
OS_CYLINDER: -1.00

## 2025-02-27 ASSESSMENT — KERATOMETRY
OD_K1POWER_DIOPTERS: 45.37
OD_AXISANGLE_DEGREES: 106
OS_AXISANGLE_DEGREES: 6
OS_K2POWER_DIOPTERS: 45.87
OS_K1POWER_DIOPTERS: 45.50
OD_K2POWER_DIOPTERS: 45.75

## 2025-02-27 ASSESSMENT — VISUAL ACUITY
OS_BCVA: 20/30+3
OD_BCVA: 20/40+3

## 2025-04-03 ENCOUNTER — RX ONLY (RX ONLY)
Age: 85
End: 2025-04-03

## 2025-04-03 ENCOUNTER — DOCTOR'S OFFICE (OUTPATIENT)
Facility: LOCATION | Age: 85
Setting detail: OPHTHALMOLOGY
End: 2025-04-03
Payer: MEDICARE

## 2025-04-03 DIAGNOSIS — H35.373: ICD-10-CM

## 2025-04-03 DIAGNOSIS — H30.22: ICD-10-CM

## 2025-04-03 DIAGNOSIS — H43.12: ICD-10-CM

## 2025-04-03 PROCEDURE — 92134 CPTRZ OPH DX IMG PST SGM RTA: CPT | Performed by: OPHTHALMOLOGY

## 2025-04-03 PROCEDURE — 76512 OPH US DX B-SCAN: CPT | Performed by: OPHTHALMOLOGY

## 2025-04-03 PROCEDURE — 67028 INJECTION EYE DRUG: CPT | Mod: LT | Performed by: OPHTHALMOLOGY

## 2025-04-03 PROCEDURE — 99213 OFFICE O/P EST LOW 20 MIN: CPT | Mod: 25 | Performed by: OPHTHALMOLOGY

## 2025-04-03 ASSESSMENT — MACULA - DRUSEN
OS_DRUSEN: 2+
OD_DRUSEN: 2+

## 2025-04-03 ASSESSMENT — SUPERFICIAL PUNCTATE KERATITIS (SPK)
OD_SPK: T
OS_SPK: ABSENT

## 2025-04-03 ASSESSMENT — CONFRONTATIONAL VISUAL FIELD TEST (CVF)
OD_FINDINGS: FULL
OS_FINDINGS: FULL

## 2025-04-03 ASSESSMENT — LID POSITION - DERMATOCHALASIS
OS_DERMATOCHALASIS: 1+ 2+
OD_DERMATOCHALASIS: 1+ 2+

## 2025-04-03 ASSESSMENT — MACULA - RETINAL PIGMENT EPITHELIAL CHANGES
OD_RPE_CHANGES: T
OS_RPE_CHANGES: T

## 2025-04-04 ENCOUNTER — DOCTOR'S OFFICE (OUTPATIENT)
Facility: LOCATION | Age: 85
Setting detail: OPHTHALMOLOGY
End: 2025-04-04
Payer: MEDICARE

## 2025-04-04 DIAGNOSIS — H43.12: ICD-10-CM

## 2025-04-04 DIAGNOSIS — H11.32: ICD-10-CM

## 2025-04-04 DIAGNOSIS — H30.22: ICD-10-CM

## 2025-04-04 PROCEDURE — 99213 OFFICE O/P EST LOW 20 MIN: CPT | Performed by: STUDENT IN AN ORGANIZED HEALTH CARE EDUCATION/TRAINING PROGRAM

## 2025-04-04 ASSESSMENT — REFRACTION_AUTOREFRACTION
OD_SPHERE: +0.25
OS_AXIS: 93
OD_CYLINDER: -1.25
OD_AXIS: 104
OS_AXIS: 070
OD_CYLINDER: -1.00
OS_CYLINDER: -1.50
OS_CYLINDER: -1.00
OS_SPHERE: -0.75
OD_SPHERE: +0.25
OD_AXIS: 104
OS_SPHERE: -0.25

## 2025-04-04 ASSESSMENT — SUPERFICIAL PUNCTATE KERATITIS (SPK)
OD_SPK: T
OS_SPK: ABSENT

## 2025-04-04 ASSESSMENT — VISUAL ACUITY
OD_BCVA: 20/80
OD_BCVA: 20/25-2
OS_BCVA: 20/40-1
OS_BCVA: 20/40-1

## 2025-04-04 ASSESSMENT — CONFRONTATIONAL VISUAL FIELD TEST (CVF)
OD_FINDINGS: FULL
OS_FINDINGS: FULL

## 2025-04-04 ASSESSMENT — KERATOMETRY
OD_K2POWER_DIOPTERS: 46.25
OS_K1POWER_DIOPTERS: 45.00
OD_AXISANGLE_DEGREES: 035
OD_AXISANGLE_DEGREES: 106
OS_AXISANGLE_DEGREES: 6
OD_K1POWER_DIOPTERS: 45.37
OS_K2POWER_DIOPTERS: 45.25
OS_K2POWER_DIOPTERS: 45.87
OS_K1POWER_DIOPTERS: 45.50
OD_K2POWER_DIOPTERS: 45.75
OD_K1POWER_DIOPTERS: 45.75
OS_AXISANGLE_DEGREES: 083

## 2025-04-04 ASSESSMENT — LID POSITION - DERMATOCHALASIS
OD_DERMATOCHALASIS: 1+ 2+
OS_DERMATOCHALASIS: 1+ 2+

## 2025-04-08 ENCOUNTER — DOCTOR'S OFFICE (OUTPATIENT)
Facility: LOCATION | Age: 85
Setting detail: OPHTHALMOLOGY
End: 2025-04-08
Payer: MEDICARE

## 2025-04-08 DIAGNOSIS — H43.12: ICD-10-CM

## 2025-04-08 DIAGNOSIS — H30.22: ICD-10-CM

## 2025-04-08 DIAGNOSIS — H11.32: ICD-10-CM

## 2025-04-08 PROCEDURE — 92134 CPTRZ OPH DX IMG PST SGM RTA: CPT | Performed by: OPHTHALMOLOGY

## 2025-04-08 PROCEDURE — 99213 OFFICE O/P EST LOW 20 MIN: CPT | Performed by: OPHTHALMOLOGY

## 2025-04-08 ASSESSMENT — KERATOMETRY
OS_K2POWER_DIOPTERS: 45.25
OD_AXISANGLE_DEGREES: 035
OD_K2POWER_DIOPTERS: 46.25
OD_K1POWER_DIOPTERS: 45.75
OS_AXISANGLE_DEGREES: 083
OS_K1POWER_DIOPTERS: 45.00

## 2025-04-08 ASSESSMENT — SUPERFICIAL PUNCTATE KERATITIS (SPK)
OS_SPK: ABSENT
OD_SPK: T

## 2025-04-08 ASSESSMENT — REFRACTION_AUTOREFRACTION
OD_AXIS: 104
OS_SPHERE: -0.75
OD_SPHERE: +0.25
OD_CYLINDER: -1.00
OS_CYLINDER: -1.50
OS_AXIS: 070

## 2025-04-08 ASSESSMENT — VISUAL ACUITY
OD_BCVA: 20/30-2
OS_BCVA: 20/25+

## 2025-04-08 ASSESSMENT — LID POSITION - DERMATOCHALASIS
OD_DERMATOCHALASIS: 1+ 2+
OS_DERMATOCHALASIS: 1+ 2+

## 2025-05-29 ENCOUNTER — DOCTOR'S OFFICE (OUTPATIENT)
Facility: LOCATION | Age: 85
Setting detail: OPHTHALMOLOGY
End: 2025-05-29
Payer: MEDICARE

## 2025-05-29 DIAGNOSIS — H40.042: ICD-10-CM

## 2025-05-29 DIAGNOSIS — H30.22: ICD-10-CM

## 2025-05-29 DIAGNOSIS — H11.32: ICD-10-CM

## 2025-05-29 DIAGNOSIS — H43.12: ICD-10-CM

## 2025-05-29 DIAGNOSIS — H35.342: ICD-10-CM

## 2025-05-29 PROCEDURE — 92134 CPTRZ OPH DX IMG PST SGM RTA: CPT | Performed by: OPHTHALMOLOGY

## 2025-05-29 PROCEDURE — 99213 OFFICE O/P EST LOW 20 MIN: CPT | Performed by: OPHTHALMOLOGY

## 2025-05-29 ASSESSMENT — KERATOMETRY
OS_K2POWER_DIOPTERS: 45.25
OD_K1POWER_DIOPTERS: 45.75
OS_K1POWER_DIOPTERS: 45.00
OD_K2POWER_DIOPTERS: 46.25
OS_AXISANGLE_DEGREES: 083
OD_AXISANGLE_DEGREES: 035

## 2025-05-29 ASSESSMENT — SUPERFICIAL PUNCTATE KERATITIS (SPK)
OS_SPK: ABSENT
OD_SPK: T

## 2025-05-29 ASSESSMENT — VISUAL ACUITY
OD_BCVA: 20/30-2
OS_BCVA: 20/20-2

## 2025-05-29 ASSESSMENT — REFRACTION_AUTOREFRACTION
OD_SPHERE: +0.25
OD_AXIS: 104
OD_CYLINDER: -1.00
OS_AXIS: 070
OS_SPHERE: -0.75
OS_CYLINDER: -1.50

## 2025-05-29 ASSESSMENT — LID POSITION - DERMATOCHALASIS
OD_DERMATOCHALASIS: 1+ 2+
OS_DERMATOCHALASIS: 1+ 2+

## 2025-06-28 ENCOUNTER — INPATIENT (INPATIENT)
Facility: HOSPITAL | Age: 85
LOS: 2 days | Discharge: HOME | DRG: 641 | End: 2025-07-01
Attending: INTERNAL MEDICINE | Admitting: INTERNAL MEDICINE
Payer: MEDICARE

## 2025-06-28 VITALS
WEIGHT: 136.03 LBS | DIASTOLIC BLOOD PRESSURE: 84 MMHG | SYSTOLIC BLOOD PRESSURE: 144 MMHG | TEMPERATURE: 98 F | RESPIRATION RATE: 16 BRPM | HEART RATE: 64 BPM | OXYGEN SATURATION: 98 %

## 2025-06-28 DIAGNOSIS — E87.1 HYPO-OSMOLALITY AND HYPONATREMIA: ICD-10-CM

## 2025-06-28 LAB
ALBUMIN SERPL ELPH-MCNC: 4.2 G/DL — SIGNIFICANT CHANGE UP (ref 3.3–5)
ALP SERPL-CCNC: 61 U/L — SIGNIFICANT CHANGE UP (ref 40–120)
ALT FLD-CCNC: 67 U/L — SIGNIFICANT CHANGE UP (ref 12–78)
ANION GAP SERPL CALC-SCNC: 10 MMOL/L — SIGNIFICANT CHANGE UP (ref 5–17)
AST SERPL-CCNC: 50 U/L — HIGH (ref 15–37)
BASOPHILS # BLD AUTO: 0.02 K/UL — SIGNIFICANT CHANGE UP (ref 0–0.2)
BASOPHILS NFR BLD AUTO: 0.2 % — SIGNIFICANT CHANGE UP (ref 0–2)
BILIRUB SERPL-MCNC: 1.8 MG/DL — HIGH (ref 0.2–1.2)
BUN SERPL-MCNC: 13 MG/DL — SIGNIFICANT CHANGE UP (ref 7–23)
CALCIUM SERPL-MCNC: 9.5 MG/DL — SIGNIFICANT CHANGE UP (ref 8.5–10.1)
CHLORIDE SERPL-SCNC: 86 MMOL/L — LOW (ref 96–108)
CO2 SERPL-SCNC: 28 MMOL/L — SIGNIFICANT CHANGE UP (ref 22–31)
CREAT SERPL-MCNC: 0.82 MG/DL — SIGNIFICANT CHANGE UP (ref 0.5–1.3)
EGFR: 71 ML/MIN/1.73M2 — SIGNIFICANT CHANGE UP
EGFR: 71 ML/MIN/1.73M2 — SIGNIFICANT CHANGE UP
EOSINOPHIL # BLD AUTO: 0.01 K/UL — SIGNIFICANT CHANGE UP (ref 0–0.5)
EOSINOPHIL NFR BLD AUTO: 0.1 % — SIGNIFICANT CHANGE UP (ref 0–6)
GLUCOSE SERPL-MCNC: 123 MG/DL — HIGH (ref 70–99)
HCT VFR BLD CALC: 36.1 % — SIGNIFICANT CHANGE UP (ref 34.5–45)
HGB BLD-MCNC: 12.1 G/DL — SIGNIFICANT CHANGE UP (ref 11.5–15.5)
IMM GRANULOCYTES # BLD AUTO: 0.05 K/UL — SIGNIFICANT CHANGE UP (ref 0–0.07)
IMM GRANULOCYTES NFR BLD AUTO: 0.6 % — SIGNIFICANT CHANGE UP (ref 0–0.9)
LYMPHOCYTES # BLD AUTO: 1.25 K/UL — SIGNIFICANT CHANGE UP (ref 1–3.3)
LYMPHOCYTES NFR BLD AUTO: 13.8 % — SIGNIFICANT CHANGE UP (ref 13–44)
MAGNESIUM SERPL-MCNC: 2.1 MG/DL — SIGNIFICANT CHANGE UP (ref 1.6–2.6)
MCHC RBC-ENTMCNC: 21.8 PG — LOW (ref 27–34)
MCHC RBC-ENTMCNC: 33.5 G/DL — SIGNIFICANT CHANGE UP (ref 32–36)
MCV RBC AUTO: 65.2 FL — LOW (ref 80–100)
MONOCYTES # BLD AUTO: 0.89 K/UL — SIGNIFICANT CHANGE UP (ref 0–0.9)
MONOCYTES NFR BLD AUTO: 9.8 % — SIGNIFICANT CHANGE UP (ref 2–14)
NEUTROPHILS # BLD AUTO: 6.85 K/UL — SIGNIFICANT CHANGE UP (ref 1.8–7.4)
NEUTROPHILS NFR BLD AUTO: 75.5 % — SIGNIFICANT CHANGE UP (ref 43–77)
NRBC # BLD AUTO: 0 K/UL — SIGNIFICANT CHANGE UP (ref 0–0)
NRBC # FLD: 0 K/UL — SIGNIFICANT CHANGE UP (ref 0–0)
NRBC BLD AUTO-RTO: 0 /100 WBCS — SIGNIFICANT CHANGE UP (ref 0–0)
PLATELET # BLD AUTO: 349 K/UL — SIGNIFICANT CHANGE UP (ref 150–400)
PMV BLD: 9.3 FL — SIGNIFICANT CHANGE UP (ref 7–13)
POTASSIUM SERPL-MCNC: 2.8 MMOL/L — CRITICAL LOW (ref 3.5–5.3)
POTASSIUM SERPL-SCNC: 2.8 MMOL/L — CRITICAL LOW (ref 3.5–5.3)
PROT SERPL-MCNC: 7.6 G/DL — SIGNIFICANT CHANGE UP (ref 6–8.3)
RBC # BLD: 5.54 M/UL — HIGH (ref 3.8–5.2)
RBC # FLD: 15.3 % — HIGH (ref 10.3–14.5)
SODIUM SERPL-SCNC: 124 MMOL/L — LOW (ref 135–145)
TROPONIN I, HIGH SENSITIVITY RESULT: 12.3 NG/L — SIGNIFICANT CHANGE UP
WBC # BLD: 9.07 K/UL — SIGNIFICANT CHANGE UP (ref 3.8–10.5)
WBC # FLD AUTO: 9.07 K/UL — SIGNIFICANT CHANGE UP (ref 3.8–10.5)

## 2025-06-28 PROCEDURE — 93010 ELECTROCARDIOGRAM REPORT: CPT

## 2025-06-28 PROCEDURE — 70450 CT HEAD/BRAIN W/O DYE: CPT

## 2025-06-28 PROCEDURE — 85025 COMPLETE CBC W/AUTO DIFF WBC: CPT

## 2025-06-28 PROCEDURE — 99285 EMERGENCY DEPT VISIT HI MDM: CPT | Mod: FS

## 2025-06-28 PROCEDURE — 71045 X-RAY EXAM CHEST 1 VIEW: CPT | Mod: 26

## 2025-06-28 PROCEDURE — 93005 ELECTROCARDIOGRAM TRACING: CPT

## 2025-06-28 PROCEDURE — 83735 ASSAY OF MAGNESIUM: CPT

## 2025-06-28 PROCEDURE — 70450 CT HEAD/BRAIN W/O DYE: CPT | Mod: 26

## 2025-06-28 PROCEDURE — 84300 ASSAY OF URINE SODIUM: CPT

## 2025-06-28 PROCEDURE — 80053 COMPREHEN METABOLIC PANEL: CPT

## 2025-06-28 PROCEDURE — 71045 X-RAY EXAM CHEST 1 VIEW: CPT

## 2025-06-28 PROCEDURE — 36415 COLL VENOUS BLD VENIPUNCTURE: CPT

## 2025-06-28 PROCEDURE — 84484 ASSAY OF TROPONIN QUANT: CPT

## 2025-06-28 PROCEDURE — 83935 ASSAY OF URINE OSMOLALITY: CPT

## 2025-06-28 RX ORDER — ATORVASTATIN CALCIUM 80 MG/1
10 TABLET, FILM COATED ORAL AT BEDTIME
Refills: 0 | Status: DISCONTINUED | OUTPATIENT
Start: 2025-06-28 | End: 2025-06-28

## 2025-06-28 RX ORDER — HEPARIN SODIUM 1000 [USP'U]/ML
5000 INJECTION INTRAVENOUS; SUBCUTANEOUS EVERY 8 HOURS
Refills: 0 | Status: DISCONTINUED | OUTPATIENT
Start: 2025-06-28 | End: 2025-07-01

## 2025-06-28 RX ORDER — LATANOPROST PF 0.05 MG/ML
1 SOLUTION/ DROPS OPHTHALMIC AT BEDTIME
Refills: 0 | Status: DISCONTINUED | OUTPATIENT
Start: 2025-06-28 | End: 2025-07-01

## 2025-06-28 RX ORDER — DILTIAZEM HYDROCHLORIDE 240 MG/1
300 TABLET, EXTENDED RELEASE ORAL DAILY
Refills: 0 | Status: DISCONTINUED | OUTPATIENT
Start: 2025-06-29 | End: 2025-06-30

## 2025-06-28 RX ADMIN — Medication 100 MILLIEQUIVALENT(S): at 16:18

## 2025-06-28 RX ADMIN — Medication 100 MILLIEQUIVALENT(S): at 15:11

## 2025-06-28 RX ADMIN — Medication 500 MILLILITER(S): at 12:37

## 2025-06-28 RX ADMIN — LATANOPROST PF 1 DROP(S): 0.05 SOLUTION/ DROPS OPHTHALMIC at 22:13

## 2025-06-28 RX ADMIN — Medication 40 MILLIEQUIVALENT(S): at 14:06

## 2025-06-28 RX ADMIN — Medication 100 MILLILITER(S): at 22:14

## 2025-06-28 RX ADMIN — Medication 100 MILLIEQUIVALENT(S): at 14:06

## 2025-06-28 RX ADMIN — Medication 100 MILLILITER(S): at 17:12

## 2025-06-28 NOTE — ED ADULT TRIAGE NOTE - AS PAIN REST
0 (no pain/absence of nonverbal indicators of pain) chest pain x this morning, tingling to bilateral arm x 2 weeks.

## 2025-06-28 NOTE — ED PROVIDER NOTE - NS ED MD TWO NIGHTS YN
Pat Sanford MD   Yes

## 2025-06-28 NOTE — ED ADULT NURSE NOTE - CHIEF COMPLAINT QUOTE
C/O Syncopal episode this am, went to  some tomatoe sauce in her basement, went back uptairs drank some tea and sat the next thing she new she was laying in the hallway, denies any pain, no focal weakness and with clear speech, v/s stable

## 2025-06-28 NOTE — H&P ADULT - NSHPPHYSICALEXAM_GEN_ALL_CORE
Vitals last 24 hrs  T(C): 36.9 (06-28-25 @ 16:10), Max: 36.9 (06-28-25 @ 11:02)  HR: 67 (06-28-25 @ 16:10) (64 - 67)  BP: 112/67 (06-28-25 @ 16:10) (112/67 - 144/84)  RR: 18 (06-28-25 @ 16:10) (16 - 18)  SpO2: 98% (06-28-25 @ 16:10) (98% - 98%)    PHYSICAL EXAM:  GENERAL: NAD, well-groomed, well-developed  HEAD:  Atraumatic, Normocephalic  EYES: EOMI, PERRLA, conjunctiva and sclera clear  ENMT: No tonsillar erythema, exudates, or enlargement; Moist mucous membranes  NECK: Supple, No JVD, Normal thyroid  HEART: Regular rate and rhythm; No murmurs, rubs, or gallops  RESPIRATORY: CTA B/L, No W/R/R  ABDOMEN: Soft, Nontender, Nondistended; Bowel sounds present  NEUROLOGY: A&Ox3, nonfocal, moving all extremities  EXTREMITIES:  2+ Peripheral Pulses, No clubbing, cyanosis, or edema  SKIN: warm, dry, normal color, no rash or abnormal lesions

## 2025-06-28 NOTE — ED PROVIDER NOTE - CLINICAL SUMMARY MEDICAL DECISION MAKING FREE TEXT BOX
Patient is an 84-year-old female who presents to the emergency room status post syncopal episode.  Past medical history of hypertension hyperlipidemia IBS.  Patient reports that she has been unsteady for the last several weeks.  Today she woke up took her MiraLAX and regular medications able to ambulate to the basement walked up the steps and sat down for several minutes and then stood up to use the restroom somewhere between her bedroom and the restroom she had a syncopal episode.  Denies any prodrome prior to the syncope no prior syncopal episodes.  Currently feeling much improved.  Unsure if she hit her head but is not on any anticoagulants.  Has been having an IBS flare that she saw her doctor for on Thursday.  Denies any fevers headache vomiting chest pain shortness of breath.  Denies any extremity numbness or weakness.  On exam patient is lying in bed no acute distress normocephalic atraumatic pupils equal round and reactive heart regular rate lungs clear to auscultation abdomen soft nontender nondistended.  No midline C-T-L tenderness to palpation.  Patient is presenting to the emergency room status post syncopal episode.  Differentials broad includes possible cardiac pathology versus electrolyte abnormality versus orthostasis versus neurologic pathology.  Will obtain screening labs cardiac enzymes EKG CT imaging of the head monitor check orthostatics.  Ultimate clinical disposition will be pending results.  Independent review of EKG reveals a normal sinus rhythm with a left axis at 62 bpm.  Independent review of CT imaging of the brain reveals no acute intracranial hemorrhage.  Independent review of chest x-ray reveals no acute infiltrate.

## 2025-06-28 NOTE — H&P ADULT - ASSESSMENT
84-year-old female with history of hypertension, hyperlipidemia, IBS presents to the ED status post syncopal event   labs cw hyponatremia and hypokalemia  CTH- unremarkable    #Syncope  recently unsteady gait  r/o neuro - cardiac event  tele monitoring  recent cardiac soto unremarkable  check orthostatics  hold home anti htn meds for now  cards eval  neuro eval      #Hyponatremia and hypokalemia  hold home hctz  iv hydration and monitor  replete lytes  check ua, u lytes    #DVT ppx- hep sq    [facility]   [phone]

## 2025-06-28 NOTE — ED ADULT NURSE NOTE - HISTORY OF COVID-19 VACCINATION
"Routing refill request to provider for review/approval because:  Kate given x1 and patient did not follow up, please advise  Labs not current:  LDL  T'd up 15 days for provider review.    Requested Prescriptions   Pending Prescriptions Disp Refills     pravastatin (PRAVACHOL) 10 MG tablet [Pharmacy Med Name: Pravastatin Sodium Oral Tablet 10 MG] 30 tablet 0     Sig: TAKE ONE TABLET BY MOUTH ONE TIME DAILY   Last Written Prescription Date:  11/4/2019  Last Fill Quantity: 30,  # refills: 0   Last office visit: 8/19/2019 with prescribing provider:     Future Office Visit:        Statins Protocol Failed - 12/14/2019  8:15 PM        Failed - LDL on file in past 12 months     Recent Labs   Lab Test 11/10/18  0831   *           Passed - No abnormal creatine kinase in past 12 months     No lab results found.           Passed - Recent (12 mo) or future (30 days) visit within the authorizing provider's specialty     Patient has had an office visit with the authorizing provider or a provider within the authorizing providers department within the previous 12 mos or has a future within next 30 days. See \"Patient Info\" tab in inbasket, or \"Choose Columns\" in Meds & Orders section of the refill encounter.            Passed - Medication is active on med list        Passed - Patient is age 18 or older        Passed - No active pregnancy on record        Passed - No positive pregnancy test in past 12 months      Chelsey Champion RN      " Yes

## 2025-06-28 NOTE — H&P ADULT - HISTORY OF PRESENT ILLNESS
84-year-old female with history of hypertension, hyperlipidemia, IBS presents to the ED status post syncopal event prior to arrival.  Patient states that she has been unsteady on her feet for the past few weeks.  Patient states she went down to her basement today to get cans of tomato purée, walked up the stairs, sat down for a few minutes, stood up to use the bathroom and passed out.  Patient states that she did not feel lightheaded or dizzy prior to syncope.  Patient states she woke up on the floor.  Patient was unsure what happened.  Patient states that now she is feeling better.  Patient does not believe she hit her head however is not sure.  No anticoagulation.  Patient admits that she saw her GI doctor on Thursday for abdominal discomfort and constipation.  Was instructed to take MiraLAX and drink plenty of fluids. Patient recently saw her cardiologist last month, had a stress test and echo which was unremarkable as per patient. Denies fever, chills, headache, dizziness, visual changes, chest pain, sob, abd pain, N/V, UE/LE weakness or paresthesias.

## 2025-06-28 NOTE — ED PROVIDER NOTE - OBJECTIVE STATEMENT
84-year-old female with history of hypertension, hyperlipidemia, IBS presents to the ED status post syncopal event prior to arrival.  Patient states that she has been unsteady on her feet for the past few weeks.  Patient states she went down to her basement today to get cans of tomato purée, walked up the stairs, sat down for a few minutes, stood up to use the bathroom and passed out.  Patient states that she did not feel lightheaded or dizzy prior to syncope.  Patient states she woke up on the floor.  Patient was unsure what happened.  Patient states that now she is feeling better.  Patient does not believe she hit her head however is not sure.  No anticoagulation.  Patient admits that she saw her GI doctor on Thursday for abdominal discomfort and constipation.  Was instructed to take MiraLAX and drink plenty of fluids. Patient recently saw her cardiologist last month, had a stress test and echo which was unremarkable as per patient. Denies fever, chills, headache, dizziness, visual changes, chest pain, sob, abd pain, N/V, UE/LE weakness or paresthesias.    pmd: Dr. Rhodes, cardiologist: Dr. Holloway

## 2025-06-28 NOTE — ED PROVIDER NOTE - CARE PLAN
Principal Discharge DX:	Hyponatremia  Secondary Diagnosis:	Hypokalemia  Secondary Diagnosis:	Syncope   1

## 2025-06-28 NOTE — CONSULT NOTE ADULT - ASSESSMENT
The patient is an 84 year old female with a history of HTN, HL, IBS who presents with syncope.    Plan:  - Syncope possibly brief orthostatic hypotension vs. vasovagal episode  - ECG with sinus rhythm and RBBB  - Check echo  - Cardiac enzymes negative  - Orthostatics negative  - Received IV fluids  - Noted to be hyponatremic and hypokalemic - possibly due to excessive free water intake  - Fluid restrict  - Replete K  - Continue losartan 50 mg daily  - Continue diltiazem  mg daily  - Remain off of HCTZ

## 2025-06-28 NOTE — ED ADULT NURSE NOTE - OBJECTIVE STATEMENT
pt aox4, " passed out at home today after taking Miralax for IBS this morning "  no sob, no chest pain, no n/v , no neck pain

## 2025-06-28 NOTE — ED PROVIDER NOTE - DIFFERENTIAL DIAGNOSIS
Patient is presenting to the emergency room status post syncopal episode.  Differentials broad includes possible cardiac pathology versus electrolyte abnormality versus orthostasis versus neurologic pathology.  Will obtain screening labs cardiac enzymes EKG CT imaging of the head monitor check orthostatics.  Ultimate clinical disposition will be pending results. Differential Diagnosis

## 2025-06-28 NOTE — H&P ADULT - NSICDXPASTMEDICALHX_GEN_ALL_CORE_FT
PAST MEDICAL HISTORY:  HLD (hyperlipidemia)     HTN (hypertension)     IBS (irritable bowel syndrome)

## 2025-06-28 NOTE — ED ADULT NURSE NOTE - BEFAST BALANCE
Quality 402: Tobacco Use And Help With Quitting Among Adolescents: Patient screened for tobacco and never smoked
Quality 154 Part A: Falls: Risk Assessment (Should Be Reported With Measure 155.): Falls risk assessment completed and documented in the past 12 months.
Detail Level: Detailed
Quality 110: Preventive Care And Screening: Influenza Immunization: Influenza Immunization Administered during Influenza season
Quality 155 (Denominator): Falls Plan Of Care: Plan of Care not Documented, Reason not Otherwise Specified
Quality 47: Advance Care Plan: Advance care planning not documented, reason not otherwise specified.
Quality 154 Part B: Falls: Risk Screening (Should Be Reported With Measure 155.): Patient screened for future fall risk; documentation of no falls in the past year or only one fall without injury in the past year
Quality 431: Preventive Care And Screening: Unhealthy Alcohol Use - Screening: Patient screened for unhealthy alcohol use using a single question and scores less than 2 times per year
Quality 131: Pain Assessment And Follow-Up: Pain assessment using a standardized tool is documented as negative, no follow-up plan required
Quality 111:Pneumonia Vaccination Status For Older Adults: Pneumococcal Vaccination Previously Received
No

## 2025-06-28 NOTE — ED ADULT NURSE NOTE - NSFALLHARMRISKINTERV_ED_ALL_ED
Assistance OOB with selected safe patient handling equipment if applicable/Communicate risk of Fall with Harm to all staff, patient, and family/Orthostatic vital signs/Provide visual cue: red socks, yellow wristband, yellow gown, etc/Reinforce activity limits and safety measures with patient and family/Bed in lowest position, wheels locked, appropriate side rails in place/Call bell, personal items and telephone in reach/Instruct patient to call for assistance before getting out of bed/chair/stretcher/Non-slip footwear applied when patient is off stretcher/[place] to call system/Physically safe environment - no spills, clutter or unnecessary equipment/Purposeful Proactive Rounding/Room/bathroom lighting operational, light cord in reach

## 2025-06-28 NOTE — PATIENT PROFILE ADULT - FALL HARM RISK - HARM RISK INTERVENTIONS

## 2025-06-28 NOTE — H&P ADULT - NSHPLABSRESULTS_GEN_ALL_CORE
LABS:                        12.1   9.07  )-----------( 349      ( 28 Jun 2025 12:30 )             36.1     06-28    124[L]  |  86[L]  |  13  ----------------------------<  123[H]  2.8[LL]   |  28  |  0.82    Ca    9.5      28 Jun 2025 12:30  Mg     2.1     06-28    TPro  7.6  /  Alb  4.2  /  TBili  1.8[H]  /  DBili  x   /  AST  50[H]  /  ALT  67  /  AlkPhos  61  06-28      CAPILLARY BLOOD GLUCOSE            Urinalysis Basic - ( 28 Jun 2025 12:30 )    Color: x / Appearance: x / SG: x / pH: x  Gluc: 123 mg/dL / Ketone: x  / Bili: x / Urobili: x   Blood: x / Protein: x / Nitrite: x   Leuk Esterase: x / RBC: x / WBC x   Sq Epi: x / Non Sq Epi: x / Bacteria: x        RADIOLOGY & ADDITIONAL TESTS:

## 2025-06-28 NOTE — ED PROVIDER NOTE - NS ED ROS FT
+ syncope  +unsteady on her feet x several weeks  unknown head trauma.   no fever  no chest pain  no headache or dizziness  no lightheadedness

## 2025-06-28 NOTE — PATIENT PROFILE ADULT - NSPROPTRIGHTNOTIFY_GEN_A_NUR
Awa from [facility] called to state that she has faxed over INR results. Please call Awa with any questions [phone] [facility].   
Per Dr Islas gave order for patient to restart 4mg of coumadin today and repeat PT/INR in 1 week. Patient had tooth extraction today and has been off coumadin since 1/21/19.  
declines

## 2025-06-28 NOTE — ED PROVIDER NOTE - PROGRESS NOTE DETAILS
Cardiology, Dr. Angela, consulted, recommends fluid restriction, will admit for electrolyte abnormality and syncope.

## 2025-06-28 NOTE — PATIENT PROFILE ADULT - HOW PATIENT ADDRESSED, PROFILE
Chanelle Kern called to request a refill on his medication. Last office visit : 9/30/2019   Next office visit : Visit date not found     Last UDS: No results found for: Staci Delarosa, [address], [address] [facility], [address] [facility], GABAPENTIN, MDMA, METAMPU, [address], OXTCOSU, [place], PROPOXYPHENE, THCSCREENUR, TRICYUR    Last Dimitris Ek: pending unable to get through  Medication Contract: none on file  Last Fill: 9/30/19    Requested Prescriptions     Pending Prescriptions Disp Refills    nateglinide (STARLIX) 120 MG tablet [Pharmacy Med Name: Nateglinide 120 MG Oral Tablet] 90 tablet 0     Sig: TAKE 1 TABLET BY MOUTH THREE TIMES DAILY BEFORE  MEALS    gabapentin (NEURONTIN) 300 MG capsule [Pharmacy Med Name: Gabapentin 300 MG Oral Capsule] 90 capsule 2     Sig: TAKE 1 CAPSULE BY MOUTH THREE TIMES DAILY    diclofenac (VOLTAREN) 50 MG EC tablet [Pharmacy Med Name: Diclofenac Sodium 50 MG Oral Tablet Delayed Release] 90 tablet 0     Sig: TAKE 1 TABLET BY MOUTH THREE TIMES DAILY WITH MEALS         Please approve or refuse this medication.    Demorest Eleanor, [place]
June

## 2025-06-28 NOTE — CONSULT NOTE ADULT - SUBJECTIVE AND OBJECTIVE BOX
History of Present Illness: The patient is an 84 year old female with a history of HTN, HL, IBS who presents with syncope. She stood up to walk to the bathroom and she passed out. No dizziness, palpitations, chest pain, shortness of breath, nausea. She has been drinking more water recently after being told to by her GI doctor due to constipation. She also has been talking miralax and has had looser stools recently.    Past Medical/Surgical History:  HTN, HL, IBS    Medications:  atorvastatin (Lipitor) 10 MG tablet TAKE 1 TABLET BY MOUTH ONCE DAILY 90 tablet    3   dilTIAZem CD (Cartia XT) 300 MG 24 hr capsule Take 1 capsule (300 mg) by mouth    in the morning. 90 capsule 3   docusate sodium (Colace) 100 MG capsule COLACE 100 MG ORAL CAPSULE   estradiol (Estrace) 0.1 MG/GM vaginal cream Insert 2 g into the vagina in the    morning.   hydrocortisone (Anusol-HC) 2.5 % rectal cream Insert into the rectum in the    morning and at bedtime. 45 g 1   latanoprost (Xalatan) 0.005 % ophthalmic solution   losartan (Cozaar) 50 MG tablet Take 1 tablet (50 mg) by mouth in the morning.    90 tablet 0   Naproxen Sodium 220 MG capsule Take by mouth.   Rhopressa 0.02 % solution   Alum Hydroxide-Mag Trisilicate (Gaviscon) 80-14.2 MG chewable tablet Chew.   hydroCHLOROthiazide (HYDRODiuril) 25 MG tablet Take 1 tablet (25 mg) by mouth    in the morning. 30 tablet 0   hydrocortisone (Anusol-HC) 25 MG suppository Insert 1 suppository (25 mg) into    the rectum if needed in the morning and at bedtime for hemorrhoids. 30    suppository 1   methenamine hippurate (Hiprex) 1 g tablet Take 1 tablet (1 g) by mouth in the    morning and at bedtime. Half a pill 2xs a day    Family History: Non-contributory family history of premature cardiovascular atherosclerotic disease    Social History: No tobacco, alcohol or drug use    Review of Systems:  General: No fevers, chills, weight gain  Skin: No rashes, color changes  Cardiovascular: No chest pain, orthopnea  Respiratory: No shortness of breath, cough  Gastrointestinal: No nausea, abdominal pain  Genitourinary: No incontinence, pain with urination  Musculoskeletal: No pain, swelling, decreased range of motion  Neurological: No headache, weakness  Psychiatric: No depression, anxiety  Endocrine: No weight gain, increased thirst  All other systems are comprehensively negative.    Physical Exam:  Vitals:        Vital Signs Last 24 Hrs  T(C): 36.9 (28 Jun 2025 11:02), Max: 36.9 (28 Jun 2025 11:02)  T(F): 98.4 (28 Jun 2025 11:02), Max: 98.4 (28 Jun 2025 11:02)  HR: 64 (28 Jun 2025 11:02) (64 - 64)  BP: 144/84 (28 Jun 2025 11:02) (144/84 - 144/84)  BP(mean): --  RR: 16 (28 Jun 2025 11:02) (16 - 16)  SpO2: 98% (28 Jun 2025 11:02) (98% - 98%)    Parameters below as of 28 Jun 2025 11:02  Patient On (Oxygen Delivery Method): room air      General: NAD  HEENT: MMM  Neck: No JVD, no carotid bruit  Lungs: CTAB  CV: RRR, nl S1/S2, no M/R/G  Abdomen: S/NT/ND, +BS  Extremities: No LE edema, no cyanosis  Neuro: AAOx3, non-focal  Skin: No rash    Labs:                        12.1   9.07  )-----------( 349      ( 28 Jun 2025 12:30 )             36.1     06-28    124[L]  |  86[L]  |  13  ----------------------------<  123[H]  2.8[LL]   |  28  |  0.82    Ca    9.5      28 Jun 2025 12:30  Mg     2.1     06-28    TPro  7.6  /  Alb  4.2  /  TBili  1.8[H]  /  DBili  x   /  AST  50[H]  /  ALT  67  /  AlkPhos  61  06-28            ECG/Telemetry: NSR, LAD, RBBB    
NEPHROLOGY CONSULTATION    CHIEF COMPLAINT: syncope    HPI:  Pt is 83 yo female with history of hypertension, hyperlipidemia, IBS presents to the ED status post syncopal event prior to arrival. No fever, chills, headache, dizziness, visual changes, chest pain, sob, abd pain, N/V, UE/LE weakness. Noted to have hyponatremia, hypokalemia. Reports po food intake is not great but drinks lots of fluids. Anxious.    ROS:  as above    Allergies:  No Known Allergies    PAST MEDICAL & SURGICAL HISTORY:  HTN (hypertension)  HLD (hyperlipidemia)  IBS (irritable bowel syndrome)    SOCIAL HISTORY:  negative    FAMILY HISTORY:  NC    MEDICATIONS  (STANDING):  heparin   Injectable 5000 Unit(s) SubCutaneous every 8 hours  latanoprost 0.005% Ophthalmic Solution 1 Drop(s) Both EYES at bedtime  sodium chloride 0.9%. 1000 milliLiter(s) (100 mL/Hr) IV Continuous <Continuous>    Vital Signs Last 24 Hrs  T(C): 36.8 (06-28-25 @ 22:19), Max: 36.9 (06-28-25 @ 11:02)  T(F): 98.2 (06-28-25 @ 22:19), Max: 98.4 (06-28-25 @ 11:02)  HR: 60 (06-28-25 @ 22:19) (60 - 67)  BP: 119/64 (06-28-25 @ 22:19) (112/67 - 144/84)  RR: 17 (06-28-25 @ 22:19) (16 - 18)  SpO2: 98% (06-28-25 @ 22:19) (98% - 98%)    s1s2  b/l air entry  soft, ND  no edema     LABS:                        12.1   9.07  )-----------( 349      ( 28 Jun 2025 12:30 )             36.1     06-28    124[L]  |  86[L]  |  13  ----------------------------<  123[H]  2.8[LL]   |  28  |  0.82    Ca    9.5      28 Jun 2025 12:30  Mg     2.1     06-28    TPro  7.6  /  Alb  4.2  /  TBili  1.8[H]  /  DBili  x   /  AST  50[H]  /  ALT  67  /  AlkPhos  61  06-28    LIVER FUNCTIONS - ( 28 Jun 2025 12:30 )  Alb: 4.2 g/dL / Pro: 7.6 g/dL / ALK PHOS: 61 U/L / ALT: 67 U/L / AST: 50 U/L / GGT: x           A/P:    Appears euvolemic  CTH, CXR w/o acute findings   Suspect SIADH  Repeat BMP tonight and in am  Will d/c IVF  Regular salt diet, FR 1L/day  Avoid ACE/ARB  Hyponatremia w/up as ordered  Will follow    [phone]

## 2025-06-29 LAB
ALBUMIN SERPL ELPH-MCNC: 3.4 G/DL — SIGNIFICANT CHANGE UP (ref 3.3–5)
ALP SERPL-CCNC: 57 U/L — SIGNIFICANT CHANGE UP (ref 40–120)
ALT FLD-CCNC: 46 U/L — SIGNIFICANT CHANGE UP (ref 12–78)
ANION GAP SERPL CALC-SCNC: 5 MMOL/L — SIGNIFICANT CHANGE UP (ref 5–17)
ANION GAP SERPL CALC-SCNC: 8 MMOL/L — SIGNIFICANT CHANGE UP (ref 5–17)
APPEARANCE UR: ABNORMAL
AST SERPL-CCNC: 30 U/L — SIGNIFICANT CHANGE UP (ref 15–37)
BILIRUB SERPL-MCNC: 1.6 MG/DL — HIGH (ref 0.2–1.2)
BILIRUB UR-MCNC: NEGATIVE — SIGNIFICANT CHANGE UP
BUN SERPL-MCNC: 12 MG/DL — SIGNIFICANT CHANGE UP (ref 7–23)
BUN SERPL-MCNC: 17 MG/DL — SIGNIFICANT CHANGE UP (ref 7–23)
CALCIUM SERPL-MCNC: 8.7 MG/DL — SIGNIFICANT CHANGE UP (ref 8.5–10.1)
CALCIUM SERPL-MCNC: 8.8 MG/DL — SIGNIFICANT CHANGE UP (ref 8.5–10.1)
CHLORIDE SERPL-SCNC: 102 MMOL/L — SIGNIFICANT CHANGE UP (ref 96–108)
CHLORIDE SERPL-SCNC: 104 MMOL/L — SIGNIFICANT CHANGE UP (ref 96–108)
CO2 SERPL-SCNC: 25 MMOL/L — SIGNIFICANT CHANGE UP (ref 22–31)
CO2 SERPL-SCNC: 26 MMOL/L — SIGNIFICANT CHANGE UP (ref 22–31)
COLOR SPEC: YELLOW — SIGNIFICANT CHANGE UP
CORTIS AM PEAK SERPL-MCNC: 15.7 UG/DL — SIGNIFICANT CHANGE UP (ref 6–18.4)
CREAT SERPL-MCNC: 0.65 MG/DL — SIGNIFICANT CHANGE UP (ref 0.5–1.3)
CREAT SERPL-MCNC: 0.74 MG/DL — SIGNIFICANT CHANGE UP (ref 0.5–1.3)
DIFF PNL FLD: NEGATIVE — SIGNIFICANT CHANGE UP
EGFR: 80 ML/MIN/1.73M2 — SIGNIFICANT CHANGE UP
EGFR: 80 ML/MIN/1.73M2 — SIGNIFICANT CHANGE UP
EGFR: 87 ML/MIN/1.73M2 — SIGNIFICANT CHANGE UP
EGFR: 87 ML/MIN/1.73M2 — SIGNIFICANT CHANGE UP
GLUCOSE SERPL-MCNC: 96 MG/DL — SIGNIFICANT CHANGE UP (ref 70–99)
GLUCOSE SERPL-MCNC: 98 MG/DL — SIGNIFICANT CHANGE UP (ref 70–99)
GLUCOSE UR QL: NEGATIVE MG/DL — SIGNIFICANT CHANGE UP
HCT VFR BLD CALC: 31.5 % — LOW (ref 34.5–45)
HGB BLD-MCNC: 10.4 G/DL — LOW (ref 11.5–15.5)
KETONES UR QL: NEGATIVE MG/DL — SIGNIFICANT CHANGE UP
LEUKOCYTE ESTERASE UR-ACNC: ABNORMAL
MCHC RBC-ENTMCNC: 21.9 PG — LOW (ref 27–34)
MCHC RBC-ENTMCNC: 33 G/DL — SIGNIFICANT CHANGE UP (ref 32–36)
MCV RBC AUTO: 66.3 FL — LOW (ref 80–100)
NITRITE UR-MCNC: NEGATIVE — SIGNIFICANT CHANGE UP
NRBC # BLD AUTO: 0 K/UL — SIGNIFICANT CHANGE UP (ref 0–0)
NRBC # FLD: 0 K/UL — SIGNIFICANT CHANGE UP (ref 0–0)
NRBC BLD AUTO-RTO: 0 /100 WBCS — SIGNIFICANT CHANGE UP (ref 0–0)
OSMOLALITY UR: 123 MOSM/KG — SIGNIFICANT CHANGE UP (ref 50–1200)
PH UR: 6 — SIGNIFICANT CHANGE UP (ref 5–8)
PLATELET # BLD AUTO: 238 K/UL — SIGNIFICANT CHANGE UP (ref 150–400)
PMV BLD: 9.7 FL — SIGNIFICANT CHANGE UP (ref 7–13)
POTASSIUM SERPL-MCNC: 3.5 MMOL/L — SIGNIFICANT CHANGE UP (ref 3.5–5.3)
POTASSIUM SERPL-MCNC: 3.6 MMOL/L — SIGNIFICANT CHANGE UP (ref 3.5–5.3)
POTASSIUM SERPL-SCNC: 3.5 MMOL/L — SIGNIFICANT CHANGE UP (ref 3.5–5.3)
POTASSIUM SERPL-SCNC: 3.6 MMOL/L — SIGNIFICANT CHANGE UP (ref 3.5–5.3)
PROT SERPL-MCNC: 6.1 G/DL — SIGNIFICANT CHANGE UP (ref 6–8.3)
PROT UR-MCNC: NEGATIVE MG/DL — SIGNIFICANT CHANGE UP
RBC # BLD: 4.75 M/UL — SIGNIFICANT CHANGE UP (ref 3.8–5.2)
RBC # FLD: 15 % — HIGH (ref 10.3–14.5)
SODIUM SERPL-SCNC: 135 MMOL/L — SIGNIFICANT CHANGE UP (ref 135–145)
SODIUM SERPL-SCNC: 135 MMOL/L — SIGNIFICANT CHANGE UP (ref 135–145)
SODIUM UR-SCNC: 30 MMOL/L — SIGNIFICANT CHANGE UP
SP GR SPEC: 1.02 — SIGNIFICANT CHANGE UP (ref 1–1.03)
TSH SERPL-MCNC: 2.29 UIU/ML — SIGNIFICANT CHANGE UP (ref 0.36–3.74)
UROBILINOGEN FLD QL: 0.2 MG/DL — SIGNIFICANT CHANGE UP (ref 0.2–1)
WBC # BLD: 6.31 K/UL — SIGNIFICANT CHANGE UP (ref 3.8–10.5)
WBC # FLD AUTO: 6.31 K/UL — SIGNIFICANT CHANGE UP (ref 3.8–10.5)

## 2025-06-29 PROCEDURE — 84484 ASSAY OF TROPONIN QUANT: CPT

## 2025-06-29 PROCEDURE — 81001 URINALYSIS AUTO W/SCOPE: CPT

## 2025-06-29 PROCEDURE — 85027 COMPLETE CBC AUTOMATED: CPT

## 2025-06-29 PROCEDURE — 85025 COMPLETE CBC W/AUTO DIFF WBC: CPT

## 2025-06-29 PROCEDURE — 70450 CT HEAD/BRAIN W/O DYE: CPT

## 2025-06-29 PROCEDURE — 84300 ASSAY OF URINE SODIUM: CPT

## 2025-06-29 PROCEDURE — 80048 BASIC METABOLIC PNL TOTAL CA: CPT

## 2025-06-29 PROCEDURE — 93005 ELECTROCARDIOGRAM TRACING: CPT

## 2025-06-29 PROCEDURE — 83935 ASSAY OF URINE OSMOLALITY: CPT

## 2025-06-29 PROCEDURE — 84443 ASSAY THYROID STIM HORMONE: CPT

## 2025-06-29 PROCEDURE — 83735 ASSAY OF MAGNESIUM: CPT

## 2025-06-29 PROCEDURE — 80053 COMPREHEN METABOLIC PANEL: CPT

## 2025-06-29 PROCEDURE — 71045 X-RAY EXAM CHEST 1 VIEW: CPT

## 2025-06-29 PROCEDURE — 82533 TOTAL CORTISOL: CPT

## 2025-06-29 PROCEDURE — 84550 ASSAY OF BLOOD/URIC ACID: CPT

## 2025-06-29 PROCEDURE — 36415 COLL VENOUS BLD VENIPUNCTURE: CPT

## 2025-06-29 RX ADMIN — DILTIAZEM HYDROCHLORIDE 300 MILLIGRAM(S): 240 TABLET, EXTENDED RELEASE ORAL at 05:14

## 2025-06-29 RX ADMIN — LATANOPROST PF 1 DROP(S): 0.05 SOLUTION/ DROPS OPHTHALMIC at 21:21

## 2025-06-29 RX ADMIN — Medication 20 MILLIGRAM(S): at 21:24

## 2025-06-29 NOTE — CARE COORDINATION ASSESSMENT. - OTHER PERTINENT DISCHARGE PLANNING INFORMATION:
CM met with patient at bedside, introduced self and explained role of CM and transitional care planning; she verbalized understanding. Patient lives in private, split level home, with 5-9 steps between floors, with spouse and son. Reports being independent PTA without use of DME; does have access to RW and canes at home, if needed; has built in tub bench. Denies any active HCS or HHA services. PCP is Dr. Rhodes, Pharmacy is Dr. Holloway. Pharmacy is [facility] in [place]. When DC ready, her sister in law will transport her home. Anticipated DC needs unclear at present, pending clinical course.

## 2025-06-29 NOTE — CARE COORDINATION ASSESSMENT. - NSCAREPROVIDERS_GEN_ALL_CORE_FT
CARE PROVIDERS:  Accepting Physician: Consuelo Vaughan  Administration: Omer Blancas  Administration: Joey Mane  Admitting: Consuelo Vaughan  Attending: Consuelo Vaughan  Consultant: Zohreh Donovan  Consultant: Austin Angela  Covering Team: Connie Horn  ED ACP: Graciela De La Rosa ED Attending: Billie Cates ED Nurse: Emily Bah  Nurse: Merced Cowart  Nurse: Dori Puckett  Ordered: ServiceAccount, [facility]  Outpatient Provider: Austin Angela  Override: Renetta Peralta  Override: Dori Puckett  Override: Apryl Rodriguez  Override: Adrienne Romero  Override: Gemini Farah  Override: Emily Bah  PCA/Nursing Assistant: Ivon Gonzalez  PCA/Nursing Assistant: dolores Machuca  Registered Dietitian: Vonnie Workman

## 2025-06-29 NOTE — CARE COORDINATION ASSESSMENT. - NSPASTMEDSURGHISTORY_GEN_ALL_CORE_FT
PAST MEDICAL & SURGICAL HISTORY:  IBS (irritable bowel syndrome)      HLD (hyperlipidemia)      HTN (hypertension)

## 2025-06-30 ENCOUNTER — RESULT REVIEW (OUTPATIENT)
Age: 85
End: 2025-06-30

## 2025-06-30 LAB
ALBUMIN SERPL ELPH-MCNC: 3.5 G/DL — SIGNIFICANT CHANGE UP (ref 3.3–5)
ALP SERPL-CCNC: 60 U/L — SIGNIFICANT CHANGE UP (ref 40–120)
ALT FLD-CCNC: 49 U/L — SIGNIFICANT CHANGE UP (ref 12–78)
ANION GAP SERPL CALC-SCNC: 9 MMOL/L — SIGNIFICANT CHANGE UP (ref 5–17)
AST SERPL-CCNC: 25 U/L — SIGNIFICANT CHANGE UP (ref 15–37)
BILIRUB SERPL-MCNC: 1.6 MG/DL — HIGH (ref 0.2–1.2)
BUN SERPL-MCNC: 13 MG/DL — SIGNIFICANT CHANGE UP (ref 7–23)
CALCIUM SERPL-MCNC: 8.4 MG/DL — LOW (ref 8.5–10.1)
CHLORIDE SERPL-SCNC: 100 MMOL/L — SIGNIFICANT CHANGE UP (ref 96–108)
CO2 SERPL-SCNC: 26 MMOL/L — SIGNIFICANT CHANGE UP (ref 22–31)
CREAT SERPL-MCNC: 0.53 MG/DL — SIGNIFICANT CHANGE UP (ref 0.5–1.3)
EGFR: 92 ML/MIN/1.73M2 — SIGNIFICANT CHANGE UP
EGFR: 92 ML/MIN/1.73M2 — SIGNIFICANT CHANGE UP
GLUCOSE SERPL-MCNC: 105 MG/DL — HIGH (ref 70–99)
HCT VFR BLD CALC: 32.3 % — LOW (ref 34.5–45)
HGB BLD-MCNC: 10.2 G/DL — LOW (ref 11.5–15.5)
MCHC RBC-ENTMCNC: 21.5 PG — LOW (ref 27–34)
MCHC RBC-ENTMCNC: 31.6 G/DL — LOW (ref 32–36)
MCV RBC AUTO: 68 FL — LOW (ref 80–100)
NRBC # BLD AUTO: 0.02 K/UL — HIGH (ref 0–0)
NRBC # FLD: 0.02 K/UL — HIGH (ref 0–0)
NRBC BLD AUTO-RTO: 0 /100 WBCS — SIGNIFICANT CHANGE UP (ref 0–0)
OSMOLALITY UR: 632 MOSM/KG — SIGNIFICANT CHANGE UP (ref 50–1200)
PLATELET # BLD AUTO: 248 K/UL — SIGNIFICANT CHANGE UP (ref 150–400)
PMV BLD: 9.1 FL — SIGNIFICANT CHANGE UP (ref 7–13)
POTASSIUM SERPL-MCNC: 3.5 MMOL/L — SIGNIFICANT CHANGE UP (ref 3.5–5.3)
POTASSIUM SERPL-SCNC: 3.5 MMOL/L — SIGNIFICANT CHANGE UP (ref 3.5–5.3)
PROT SERPL-MCNC: 6.4 G/DL — SIGNIFICANT CHANGE UP (ref 6–8.3)
RBC # BLD: 4.75 M/UL — SIGNIFICANT CHANGE UP (ref 3.8–5.2)
RBC # FLD: 15.3 % — HIGH (ref 10.3–14.5)
SODIUM SERPL-SCNC: 135 MMOL/L — SIGNIFICANT CHANGE UP (ref 135–145)
SODIUM UR-SCNC: 48 MMOL/L — SIGNIFICANT CHANGE UP
URATE SERPL-MCNC: 4.4 MG/DL — SIGNIFICANT CHANGE UP (ref 2.5–7)
WBC # BLD: 6.81 K/UL — SIGNIFICANT CHANGE UP (ref 3.8–10.5)
WBC # FLD AUTO: 6.81 K/UL — SIGNIFICANT CHANGE UP (ref 3.8–10.5)

## 2025-06-30 PROCEDURE — 93880 EXTRACRANIAL BILAT STUDY: CPT | Mod: 26

## 2025-06-30 PROCEDURE — 70551 MRI BRAIN STEM W/O DYE: CPT | Mod: 26

## 2025-06-30 RX ORDER — ASPIRIN 325 MG
325 TABLET ORAL DAILY
Refills: 0 | Status: DISCONTINUED | OUTPATIENT
Start: 2025-07-01 | End: 2025-07-01

## 2025-06-30 RX ORDER — DILTIAZEM HYDROCHLORIDE 240 MG/1
300 TABLET, EXTENDED RELEASE ORAL DAILY
Refills: 0 | Status: DISCONTINUED | OUTPATIENT
Start: 2025-07-01 | End: 2025-07-01

## 2025-06-30 RX ORDER — ASPIRIN 325 MG
81 TABLET ORAL DAILY
Refills: 0 | Status: DISCONTINUED | OUTPATIENT
Start: 2025-06-30 | End: 2025-06-30

## 2025-06-30 RX ADMIN — HEPARIN SODIUM 5000 UNIT(S): 1000 INJECTION INTRAVENOUS; SUBCUTANEOUS at 21:28

## 2025-06-30 RX ADMIN — Medication 20 MILLIGRAM(S): at 13:10

## 2025-06-30 RX ADMIN — HEPARIN SODIUM 5000 UNIT(S): 1000 INJECTION INTRAVENOUS; SUBCUTANEOUS at 13:10

## 2025-06-30 RX ADMIN — DILTIAZEM HYDROCHLORIDE 300 MILLIGRAM(S): 240 TABLET, EXTENDED RELEASE ORAL at 05:34

## 2025-06-30 RX ADMIN — LATANOPROST PF 1 DROP(S): 0.05 SOLUTION/ DROPS OPHTHALMIC at 21:22

## 2025-07-01 ENCOUNTER — TRANSCRIPTION ENCOUNTER (OUTPATIENT)
Age: 85
End: 2025-07-01

## 2025-07-01 VITALS
RESPIRATION RATE: 18 BRPM | SYSTOLIC BLOOD PRESSURE: 121 MMHG | DIASTOLIC BLOOD PRESSURE: 70 MMHG | HEART RATE: 71 BPM | TEMPERATURE: 98 F | OXYGEN SATURATION: 98 %

## 2025-07-01 LAB
ALBUMIN SERPL ELPH-MCNC: 3.6 G/DL — SIGNIFICANT CHANGE UP (ref 3.3–5)
ALP SERPL-CCNC: 61 U/L — SIGNIFICANT CHANGE UP (ref 40–120)
ALT FLD-CCNC: 52 U/L — SIGNIFICANT CHANGE UP (ref 12–78)
ANION GAP SERPL CALC-SCNC: 5 MMOL/L — SIGNIFICANT CHANGE UP (ref 5–17)
AST SERPL-CCNC: 24 U/L — SIGNIFICANT CHANGE UP (ref 15–37)
BILIRUB SERPL-MCNC: 1.8 MG/DL — HIGH (ref 0.2–1.2)
BUN SERPL-MCNC: 11 MG/DL — SIGNIFICANT CHANGE UP (ref 7–23)
CALCIUM SERPL-MCNC: 9 MG/DL — SIGNIFICANT CHANGE UP (ref 8.5–10.1)
CHLORIDE SERPL-SCNC: 103 MMOL/L — SIGNIFICANT CHANGE UP (ref 96–108)
CHOLEST SERPL-MCNC: 131 MG/DL — SIGNIFICANT CHANGE UP
CHOLEST SERPL-MCNC: 148 MG/DL — SIGNIFICANT CHANGE UP
CO2 SERPL-SCNC: 30 MMOL/L — SIGNIFICANT CHANGE UP (ref 22–31)
CREAT SERPL-MCNC: 0.52 MG/DL — SIGNIFICANT CHANGE UP (ref 0.5–1.3)
EGFR: 92 ML/MIN/1.73M2 — SIGNIFICANT CHANGE UP
EGFR: 92 ML/MIN/1.73M2 — SIGNIFICANT CHANGE UP
GLUCOSE SERPL-MCNC: 87 MG/DL — SIGNIFICANT CHANGE UP (ref 70–99)
HCT VFR BLD CALC: 33.5 % — LOW (ref 34.5–45)
HDLC SERPL-MCNC: 59 MG/DL — SIGNIFICANT CHANGE UP
HDLC SERPL-MCNC: 61 MG/DL — SIGNIFICANT CHANGE UP
HGB BLD-MCNC: 10.8 G/DL — LOW (ref 11.5–15.5)
LDLC SERPL-MCNC: 52 MG/DL — SIGNIFICANT CHANGE UP
LDLC SERPL-MCNC: 64 MG/DL — SIGNIFICANT CHANGE UP
LIPID PNL WITH DIRECT LDL SERPL: 52 MG/DL — SIGNIFICANT CHANGE UP
LIPID PNL WITH DIRECT LDL SERPL: 64 MG/DL — SIGNIFICANT CHANGE UP
MCHC RBC-ENTMCNC: 21.7 PG — LOW (ref 27–34)
MCHC RBC-ENTMCNC: 32.2 G/DL — SIGNIFICANT CHANGE UP (ref 32–36)
MCV RBC AUTO: 67.4 FL — LOW (ref 80–100)
NONHDLC SERPL-MCNC: 72 MG/DL — SIGNIFICANT CHANGE UP
NONHDLC SERPL-MCNC: 87 MG/DL — SIGNIFICANT CHANGE UP
NRBC # BLD AUTO: 0.02 K/UL — HIGH (ref 0–0)
NRBC # FLD: 0.02 K/UL — HIGH (ref 0–0)
NRBC BLD AUTO-RTO: 0 /100 WBCS — SIGNIFICANT CHANGE UP (ref 0–0)
PLATELET # BLD AUTO: 244 K/UL — SIGNIFICANT CHANGE UP (ref 150–400)
PMV BLD: 9.2 FL — SIGNIFICANT CHANGE UP (ref 7–13)
POTASSIUM SERPL-MCNC: 3.3 MMOL/L — LOW (ref 3.5–5.3)
POTASSIUM SERPL-SCNC: 3.3 MMOL/L — LOW (ref 3.5–5.3)
PROT SERPL-MCNC: 6.7 G/DL — SIGNIFICANT CHANGE UP (ref 6–8.3)
RBC # BLD: 4.97 M/UL — SIGNIFICANT CHANGE UP (ref 3.8–5.2)
RBC # FLD: 16 % — HIGH (ref 10.3–14.5)
SODIUM SERPL-SCNC: 138 MMOL/L — SIGNIFICANT CHANGE UP (ref 135–145)
TRIGL SERPL-MCNC: 108 MG/DL — SIGNIFICANT CHANGE UP
TRIGL SERPL-MCNC: 136 MG/DL — SIGNIFICANT CHANGE UP
WBC # BLD: 6.24 K/UL — SIGNIFICANT CHANGE UP (ref 3.8–10.5)
WBC # FLD AUTO: 6.24 K/UL — SIGNIFICANT CHANGE UP (ref 3.8–10.5)

## 2025-07-01 PROCEDURE — 83935 ASSAY OF URINE OSMOLALITY: CPT

## 2025-07-01 PROCEDURE — 84300 ASSAY OF URINE SODIUM: CPT

## 2025-07-01 PROCEDURE — 85027 COMPLETE CBC AUTOMATED: CPT

## 2025-07-01 PROCEDURE — 80061 LIPID PANEL: CPT

## 2025-07-01 PROCEDURE — 70450 CT HEAD/BRAIN W/O DYE: CPT

## 2025-07-01 PROCEDURE — 84550 ASSAY OF BLOOD/URIC ACID: CPT

## 2025-07-01 PROCEDURE — 83735 ASSAY OF MAGNESIUM: CPT

## 2025-07-01 PROCEDURE — 36415 COLL VENOUS BLD VENIPUNCTURE: CPT

## 2025-07-01 PROCEDURE — 81001 URINALYSIS AUTO W/SCOPE: CPT

## 2025-07-01 PROCEDURE — 93880 EXTRACRANIAL BILAT STUDY: CPT

## 2025-07-01 PROCEDURE — 85025 COMPLETE CBC W/AUTO DIFF WBC: CPT

## 2025-07-01 PROCEDURE — 96376 TX/PRO/DX INJ SAME DRUG ADON: CPT

## 2025-07-01 PROCEDURE — 97162 PT EVAL MOD COMPLEX 30 MIN: CPT

## 2025-07-01 PROCEDURE — 93306 TTE W/DOPPLER COMPLETE: CPT

## 2025-07-01 PROCEDURE — 84443 ASSAY THYROID STIM HORMONE: CPT

## 2025-07-01 PROCEDURE — 87086 URINE CULTURE/COLONY COUNT: CPT

## 2025-07-01 PROCEDURE — 99285 EMERGENCY DEPT VISIT HI MDM: CPT

## 2025-07-01 PROCEDURE — 96374 THER/PROPH/DIAG INJ IV PUSH: CPT

## 2025-07-01 PROCEDURE — 71045 X-RAY EXAM CHEST 1 VIEW: CPT

## 2025-07-01 PROCEDURE — 80048 BASIC METABOLIC PNL TOTAL CA: CPT

## 2025-07-01 PROCEDURE — 82533 TOTAL CORTISOL: CPT

## 2025-07-01 PROCEDURE — 70551 MRI BRAIN STEM W/O DYE: CPT

## 2025-07-01 PROCEDURE — 80053 COMPREHEN METABOLIC PANEL: CPT

## 2025-07-01 PROCEDURE — 93005 ELECTROCARDIOGRAM TRACING: CPT

## 2025-07-01 PROCEDURE — 84484 ASSAY OF TROPONIN QUANT: CPT

## 2025-07-01 PROCEDURE — 97166 OT EVAL MOD COMPLEX 45 MIN: CPT

## 2025-07-01 RX ORDER — ASPIRIN 325 MG
1 TABLET ORAL
Qty: 30 | Refills: 0
Start: 2025-07-01 | End: 2025-07-30

## 2025-07-01 RX ORDER — ATORVASTATIN CALCIUM 80 MG/1
40 TABLET, FILM COATED ORAL AT BEDTIME
Refills: 0 | Status: DISCONTINUED | OUTPATIENT
Start: 2025-07-01 | End: 2025-07-01

## 2025-07-01 RX ORDER — ATORVASTATIN CALCIUM 80 MG/1
1 TABLET, FILM COATED ORAL
Qty: 30 | Refills: 0
Start: 2025-07-01 | End: 2025-07-30

## 2025-07-01 RX ORDER — DILTIAZEM HYDROCHLORIDE 240 MG/1
1 TABLET, EXTENDED RELEASE ORAL
Qty: 0 | Refills: 0 | DISCHARGE
Start: 2025-07-01

## 2025-07-01 RX ORDER — LATANOPROST PF 0.05 MG/ML
1 SOLUTION/ DROPS OPHTHALMIC
Qty: 0 | Refills: 0 | DISCHARGE
Start: 2025-07-01

## 2025-07-01 RX ADMIN — Medication 20 MILLIGRAM(S): at 12:06

## 2025-07-01 RX ADMIN — DILTIAZEM HYDROCHLORIDE 300 MILLIGRAM(S): 240 TABLET, EXTENDED RELEASE ORAL at 05:23

## 2025-07-01 RX ADMIN — Medication 325 MILLIGRAM(S): at 15:18

## 2025-07-01 RX ADMIN — Medication 20 MILLIEQUIVALENT(S): at 12:06

## 2025-07-01 RX ADMIN — HEPARIN SODIUM 5000 UNIT(S): 1000 INJECTION INTRAVENOUS; SUBCUTANEOUS at 05:23

## 2025-07-01 NOTE — DIETITIAN INITIAL EVALUATION ADULT - PERTINENT MEDS FT
MEDICATIONS  (STANDING):  aspirin enteric coated 325 milliGRAM(s) Oral daily  diltiazem    milliGRAM(s) Oral daily  famotidine    Tablet 20 milliGRAM(s) Oral daily  heparin   Injectable 5000 Unit(s) SubCutaneous every 8 hours  latanoprost 0.005% Ophthalmic Solution 1 Drop(s) Both EYES at bedtime    MEDICATIONS  (PRN):   MEDICATIONS  (STANDING):  aspirin enteric coated 325 milliGRAM(s) Oral daily  diltiazem    milliGRAM(s) Oral daily  famotidine    Tablet 20 milliGRAM(s) Oral daily  heparin   Injectable 5000 Unit(s) SubCutaneous every 8 hours  latanoprost 0.005% Ophthalmic Solution 1 Drop(s) Both EYES at bedtime

## 2025-07-01 NOTE — PHYSICAL THERAPY INITIAL EVALUATION ADULT - GENERAL OBSERVATIONS, REHAB EVAL
Patient received standing near bed, NAD Patient received standing near bed, NAD +agreeable to PT at this time.

## 2025-07-01 NOTE — DISCHARGE NOTE NURSING/CASE MANAGEMENT/SOCIAL WORK - PATIENT PORTAL LINK FT
You can access the FollowMyHealth Patient Portal offered by [facility] by registering at the following website: [email or web address]. By joining [facility]’s FollowMyHealth portal, you will also be able to view your health information using other applications (apps) compatible with our system.

## 2025-07-01 NOTE — OCCUPATIONAL THERAPY INITIAL EVALUATION ADULT - GENERAL OBSERVATIONS, REHAB EVAL
Patient found standing in bathroom with +telemonitor and tolerated 30 minutes of OT evaluation well. Patient chart reviewed, events noted. Patient cleared to be seen for therapy by RN prior to session.

## 2025-07-01 NOTE — PHYSICAL THERAPY INITIAL EVALUATION ADULT - ADDITIONAL COMMENTS
Patient lives in private home 2 REYES, multiple steps in side the home. Patient was independent in ADL skills and ambulation.

## 2025-07-01 NOTE — OCCUPATIONAL THERAPY INITIAL EVALUATION ADULT - RANGE OF MOTION EXAMINATION, UPPER EXTREMITY
Muscle strength UE's: 5/5 t/o. Fine motor skills: WNLs./bilateral UE Active ROM was WFL  (within functional limits)

## 2025-07-01 NOTE — DISCHARGE NOTE NURSING/CASE MANAGEMENT/SOCIAL WORK - FINANCIAL ASSISTANCE
[facility] provides services at a reduced cost to those who are determined to be eligible through [facility]’s financial assistance program. Information regarding [facility]’s financial assistance program can be found by going to [email or web address] or by calling [phone].

## 2025-07-01 NOTE — PHYSICAL THERAPY INITIAL EVALUATION ADULT - ACTIVE RANGE OF MOTION EXAMINATION, REHAB EVAL
Occupational Therapy  Patient not seen in therapy.     Occupational therapy reattempted to see pt however pt declined having concerns with self cares and transfers. Writer educated on WB status and reported being heel WB prior to surgery due to pain. Pt reported ambulating with and without 2ww and declined concerns. Per pt, wound care recommended using crutches or knee scooter, PT will address. Educated on LB dressing and sequencing with wound vac. If further concerns arise, reorder.                            no Active ROM deficits were identified

## 2025-07-01 NOTE — DISCHARGE NOTE PROVIDER - NSDCHHPTASSISTHOME_GEN_ALL_CORE
No-Patient/Caregiver offered and refused free interpretation services. Patient Needs Assistance to Leave Residence...

## 2025-07-01 NOTE — DISCHARGE NOTE NURSING/CASE MANAGEMENT/SOCIAL WORK - NSPROMEDSBROUGHTTOHOSP_GEN_A_NUR
no Resolved but no improvement in her mental status   Patient NPO at this time. On IVF  Management per medical team   Patient's mental status remains very poor.

## 2025-07-01 NOTE — DIETITIAN INITIAL EVALUATION ADULT - OTHER INFO
84-year-old female with history of hypertension, hyperlipidemia, IBS presents to the ED status post syncopal event   labs cw hyponatremia and hypokalemia  CTH- unremarkable    #Syncope  recently unsteady gait  r/o neuro - cardiac event  tele monitoring  recent cardiac soto unremarkable  check orthostatics  hold home anti htn meds for now  cards eval  neuro eval- MRI brain      #Hyponatremia and hypokalemia- resolved  hold home hctz  iv hydration and monitor  replete lytes     The patient is an 84 year old female with a history of HTN, HL, IBS who presented with syncope ,  possibly brief orthostatic hypotension vs. vasovagal episode  hyponatremia and hypokalemia- resolved   The patient is an 84 year old female with a history of HTN, HL, IBS who presented with syncope - possibly brief orthostatic hypotension vs. vasovagal episode  and hyponatremia and hypokalemia. Todays serum an level is 138 and w/i ref range ( on admission was 124) .Todays serum potassium 3.3 yesterday 3.5, on admission 2.8, continues to receive kcl supplementation . S/P IVF's of nacl 0.9%   At time of RD visit to pts bedside, states she is eating well and hopes to go home today, offers no nutr related complaints

## 2025-07-01 NOTE — PROGRESS NOTE ADULT - ASSESSMENT
84-year-old female with history of hypertension, hyperlipidemia, IBS presents to the ED status post syncopal event   labs cw hyponatremia and hypokalemia  CTH- unremarkable    #Syncope  recently unsteady gait  r/o neuro - cardiac event  tele monitoring  recent cardiac soto unremarkable  check orthostatics  hold home anti htn meds for now  cards eval  neuro eval- MRI brain      #Hyponatremia and hypokalemia- resolved  hold home hctz  iv hydration and monitor  replete lytes      #DVT ppx- hep sq    [facility]   [phone]  
84-year-old female with history of hypertension, hyperlipidemia, IBS presents to the ED status post syncopal event   labs cw hyponatremia and hypokalemia  CTH- unremarkable    #Syncope  recently unsteady gait  r/o neuro - cardiac event  tele monitoring  recent cardiac soto unremarkable  check orthostatics  hold home anti htn meds for now  cards eval  neuro eval- MRI brain      #Hyponatremia and hypokalemia- resolved  hold home hctz  iv hydration and monitor  replete lytes      #DVT ppx- hep sq    PT and OT  
The patient is an 84 year old female with a history of HTN, HL, IBS who presents with syncope.    Plan:  - Syncope possibly brief orthostatic hypotension vs. vasovagal episode  - ECG with sinus rhythm and RBBB  - Echo pending - can be done as outpatient if needed  - Cardiac enzymes negative  - Orthostatics negative  - Noted to be hyponatremic and hypokalemic - possibly due to excessive free water intake  - Both have normalized  - Resume losartan on discharge  - Continue diltiazem  mg daily  - Remain off of HCTZ  - Possible GI bleed - check FOBT. Monitor hemoglobin.
Appears euvolemic  CTH, CXR w/o acute findings   Suspect SIADH  Regular salt diet. Improved and stable sodium levels.   To continue current meds. Avoid excessive PO fluids. 
The patient is an 84 year old female with a history of HTN, HL, IBS who presents with syncope.    Plan:  - Syncope possibly brief orthostatic hypotension vs. vasovagal episode  - ECG with sinus rhythm and RBBB  - Echo pending - can be done as outpatient if needed  - Cardiac enzymes negative  - Orthostatics negative  - Noted to be hyponatremic and hypokalemic - possibly due to excessive free water intake  - Both have normalized  - If BP trends back up, can resume losartan  - Continue diltiazem  mg daily  - Remain off of HCTZ  - Ok for discharge from a cardiac standpoint
The patient is an 84 year old female with a history of HTN, HL, IBS who presents with syncope.    Plan:  - Syncope possibly brief orthostatic hypotension vs. vasovagal episode  - ECG with sinus rhythm and RBBB  - Echo with normal LV systolic function, no significant valve issues  - MRI head with small acute infarct  - Cardiac enzymes negative  - Orthostatics negative  - Hypokalemia and hyponatremia - normalized  - Resume losartan on discharge  - Continue diltiazem  mg daily  - Remain off of HCTZ  - Continue aspirin 81 mg daily  - Defer to neurology if dual antiplatelet therapy needed

## 2025-07-01 NOTE — OCCUPATIONAL THERAPY INITIAL EVALUATION ADULT - PERTINENT HX OF CURRENT PROBLEM, REHAB EVAL
84 year-old female with history of HTN, HLD, IBS presents to the ED status post syncopal event prior to arrival.  Patient states that she has been unsteady on her feet for the past few weeks.  Patient states she went down to her basement today to get cans of tomato purée, walked up the stairs, sat down for a few minutes, stood up to use the bathroom and passed out.  Patient states that she did not feel lightheaded or dizzy prior to syncope.  Patient states she woke up on the floor.  Patient was unsure what happened.  Patient states that now she is feeling better.  Patient does not believe she hit her head however is not sure.  No anticoagulation.  Patient admits that she saw her GI doctor on Thursday for abdominal discomfort and constipation.  Was instructed to take MiraLAX and drink plenty of fluids. Patient recently saw her cardiologist last month, had a stress test and echo which was unremarkable as per patient. CT head: (-) neuro event, MR brain 6/30/25: +small acute infarct right parasagittal frontal lobe. Patient admitted 6/28/25 with hyponatremia. Patient s/p CVA/TIA with MRS=1.

## 2025-07-01 NOTE — PHYSICAL THERAPY INITIAL EVALUATION ADULT - PERTINENT HX OF CURRENT PROBLEM, REHAB EVAL
As per EMR "  84-year-old female with history of hypertension, hyperlipidemia, IBS presents to the ED status post syncopal event prior to arrival.  Patient states that she has been unsteady on her feet for the past few weeks.  Patient states she went down to her basement today to get cans of tomato purée, walked up the stairs, sat down for a few minutes, stood up to use the bathroom and passed out.  Patient states that she did not feel lightheaded or dizzy prior to syncope.  Patient states she woke up on the floor.  Patient was unsure what happened.  Patient states that now she is feeling better.  Patient does not believe she hit her head however is not sure.  No anticoagulation.  Patient admits that she saw her GI doctor on Thursday for abdominal discomfort and constipation.  Was instructed to take MiraLAX and drink plenty of fluids. Patient recently saw her cardiologist last month, had a stress test and echo which was unremarkable as per patient. Denies fever, chills, headache, dizziness, visual changes, chest pain, sob, abd pain, N/V, UE/LE weakness or paresthesias." As per EMR "  84-year-old female with history of hypertension, hyperlipidemia, IBS presents to the ED status post syncopal event prior to arrival.  Patient states that she has been unsteady on her feet for the past few weeks.  Patient states she went down to her basement today to get cans of tomato purée, walked up the stairs, sat down for a few minutes, stood up to use the bathroom and passed out.  Patient states that she did not feel lightheaded or dizzy prior to syncope.  Patient states she woke up on the floor.  Patient was unsure what happened.  Patient states that now she is feeling better.  Patient does not believe she hit her head however is not sure.  No anticoagulation.  Patient admits that she saw her GI doctor on Thursday for abdominal discomfort and constipation.  Was instructed to take MiraLAX and drink plenty of fluids. Patient recently saw her cardiologist last month, had a stress test and echo which was unremarkable as per patient. Denies fever, chills, headache, dizziness, visual changes, chest pain, sob, abd pain, N/V, UE/LE weakness or paresthesias." MR Head: "Small acute infarct in the right parasagittal frontal lobe." Pt presents with (+)CVA/TIA, MRS = 1.

## 2025-07-01 NOTE — GOALS OF CARE CONVERSATION - ADVANCED CARE PLANNING - CONVERSATION DETAILS
Writer met with pt at bedside. Reviewed patient's medical and social history as well as events leading to patient's hospitalization. Writer discussed patient's current diagnosis (.abnormal labs), medical condition and management, prognosis, and life expectancy. Inquired about patient's wishes regarding extent of medical care to be provided including escalation of medical care into the ICU and use of vasopressor support. In addition, the writer inquired about thoughts regarding cardiopulmonary resuscitation, artificial nutrition and hydration including use of feeding tubes and IVF, antibiotics, and further investigative studies such as blood draws and radiology.Pt. showed  insight into medical condition. All questions answered. Pt has discussed her wishes with her son AALIYAH Chen. Pt wants resuscitation. Psychosocial support provided.

## 2025-07-01 NOTE — DISCHARGE NOTE PROVIDER - NSDCCPCAREPLAN_GEN_ALL_CORE_FT
PRINCIPAL DISCHARGE DIAGNOSIS  Diagnosis: Hyponatremia  Assessment and Plan of Treatment: follow up with PMD Dr. goode      SECONDARY DISCHARGE DIAGNOSES  Diagnosis: Hypokalemia  Assessment and Plan of Treatment:     Diagnosis: Syncope  Assessment and Plan of Treatment:

## 2025-07-01 NOTE — DISCHARGE NOTE PROVIDER - HOSPITAL COURSE
TBD    Visit Details:    - Summary: June Cortes, an 87-year-old white female with multiple medical problems, presented to the emergency room after an episode of syncope. She was admitted for evaluation and treatment. During her hospital stay, she was seen by cardiology and neurology, and was found to have hyponatremia. CT head was negative, but MRI revealed a small acute stroke. Carotid doppler and echocardiogram were negative. The patient was discharged in a medically stable condition after receiving care from neurology.    - Diagnosis: Syncope, Small acute stroke, Hyponatremia     Medications:    - ASA, statin     Treatment Plan:    - Patient was evaluated and treated by cardiology and neurology. Diagnostic tests performed included CT head, MRI, carotid doppler, and echocardiogram. Patient also received physical therapy (PT) and occupational therapy (OT) evaluations.     Follow Up Instructions:    - Patient instructed to follow up with Dr. Whiteside (likely the neurologist) and her primary care physician, Dr. Shields.     Test Results:    - CT head: Negative, MRI: Revealed a small acute stroke, Carotid doppler: Negative, Echocardiogram: Negative     Preventive Care Recommendations:    - No specific preventive care recommendations provided in the given information.     Additional Notes:    - Patient denied any other associated symptoms at the time of presentation. The patient was spoken to throughout the hospital stay.     Appointment Information:    - Date:     - Time:     - Location:     - Provider: Dr. whiteside    - Reason:

## 2025-07-01 NOTE — PROGRESS NOTE ADULT - SUBJECTIVE AND OBJECTIVE BOX
[facility]                                                       Dr. Yost, Dr. Dempsey, Dr. Donovan, Dr. Jiang, Dr. Li                                      [facility], [facility], [address]                                                 [address] [place] [address]                                      Ph: [phone]  Fax: [phone]                                         Ph: [phone]  Fax: [phone]      Patient is a 84y old  Female who presents with a chief complaint of syncope (01 Jul 2025 13:06)  Patient seen in follow up for hyponatremia       PAST MEDICAL HISTORY:  HTN (hypertension)    HLD (hyperlipidemia)    IBS (irritable bowel syndrome)      MEDICATIONS  (STANDING):  aspirin enteric coated 325 milliGRAM(s) Oral daily  diltiazem    milliGRAM(s) Oral daily  famotidine    Tablet 20 milliGRAM(s) Oral daily  heparin   Injectable 5000 Unit(s) SubCutaneous every 8 hours  latanoprost 0.005% Ophthalmic Solution 1 Drop(s) Both EYES at bedtimeVital Signs Last 24 Hrs  T(C): 36.6 (30 Jun 2025 05:00), Max: 36.9 (29 Jun 2025 12:45)  T(F): 97.9 (30 Jun 2025 05:00), Max: 98.4 (29 Jun 2025 12:45)  HR: 61 (30 Jun 2025 05:00) (61 - 73)  BP: 128/68 (30 Jun 2025 05:00) (119/63 - 157/72)  BP(mean): --  RR: 18 (30 Jun 2025 05:00) (17 - 18)    PHYSICAL EXAM:  General: No distress  Respiratory: b/l air entry  Cardiovascular: S1 S2  Gastrointestinal: soft  Extremities:  edema      LABS:                        10.2   6.81  )-----------( 248      ( 30 Jun 2025 07:50 )             32.3     30 Jun 2025 07:50    135    |  100    |  13     ----------------------------<  105    3.5     |  26     |  0.53     Ca    8.4        30 Jun 2025 07:50    TPro  6.4    /  Alb  3.5    /  TBili  1.6    /  DBili  x      /  AST  25     /  ALT  49     /  AlkPhos  60     30 Jun 2025 07:50               
Chief Complaint: Syncope    Interval Events: No events overnight.    Review of Systems:  General: No fevers, chills, weight gain  Skin: No rashes, color changes  Cardiovascular: No chest pain, orthopnea  Respiratory: No shortness of breath, cough  Gastrointestinal: No nausea, abdominal pain  Genitourinary: No incontinence, pain with urination  Musculoskeletal: No pain, swelling, decreased range of motion  Neurological: No headache, weakness  Psychiatric: No depression, anxiety  Endocrine: No weight gain, increased thirst  All other systems are comprehensively negative.    Physical Exam:  Vital Signs Last 24 Hrs  T(C): 36.9 (01 Jul 2025 05:06), Max: 36.9 (30 Jun 2025 11:50)  T(F): 98.4 (01 Jul 2025 05:06), Max: 98.4 (30 Jun 2025 11:50)  HR: 68 (01 Jul 2025 05:06) (68 - 78)  BP: 152/79 (01 Jul 2025 05:06) (147/63 - 152/79)  BP(mean): --  RR: 18 (01 Jul 2025 05:06) (18 - 18)  SpO2: 98% (01 Jul 2025 05:06) (97% - 98%)  Parameters below as of 01 Jul 2025 05:06  Patient On (Oxygen Delivery Method): room air  General: NAD  HEENT: MMM  Neck: No JVD, no carotid bruit  Lungs: CTAB  CV: RRR, nl S1/S2, no M/R/G  Abdomen: S/NT/ND, +BS  Extremities: No LE edema, no cyanosis  Neuro: AAOx3, non-focal  Skin: No rash    Labs:             07-01    138  |  103  |  11  ----------------------------<  87  3.3[L]   |  30  |  0.52    Ca    9.0      01 Jul 2025 05:45    TPro  6.7  /  Alb  3.6  /  TBili  1.8[H]  /  DBili  x   /  AST  24  /  ALT  52  /  AlkPhos  61  07-01                        10.8   6.24  )-----------( 244      ( 01 Jul 2025 05:45 )             33.5         ECG/Telemetry: Sinus rhythm
Chief Complaint: Syncope    Interval Events: No events overnight.    Review of Systems:  General: No fevers, chills, weight gain  Skin: No rashes, color changes  Cardiovascular: No chest pain, orthopnea  Respiratory: No shortness of breath, cough  Gastrointestinal: No nausea, abdominal pain  Genitourinary: No incontinence, pain with urination  Musculoskeletal: No pain, swelling, decreased range of motion  Neurological: No headache, weakness  Psychiatric: No depression, anxiety  Endocrine: No weight gain, increased thirst  All other systems are comprehensively negative.    Physical Exam:  Vitals:        Vital Signs Last 24 Hrs  T(C): 36.6 (29 Jun 2025 05:05), Max: 36.9 (28 Jun 2025 11:02)  T(F): 97.9 (29 Jun 2025 05:05), Max: 98.4 (28 Jun 2025 11:02)  HR: 60 (29 Jun 2025 05:05) (60 - 67)  BP: 107/62 (29 Jun 2025 05:05) (107/62 - 144/84)  BP(mean): --  RR: 17 (29 Jun 2025 05:05) (16 - 18)  SpO2: 98% (29 Jun 2025 05:05) (98% - 98%)    Parameters below as of 29 Jun 2025 05:05  Patient On (Oxygen Delivery Method): room air      General: NAD  HEENT: MMM  Neck: No JVD, no carotid bruit  Lungs: CTAB  CV: RRR, nl S1/S2, no M/R/G  Abdomen: S/NT/ND, +BS  Extremities: No LE edema, no cyanosis  Neuro: AAOx3, non-focal  Skin: No rash    Labs:                        10.4   6.31  )-----------( 238      ( 29 Jun 2025 06:50 )             31.5     06-29    135  |  104  |  17  ----------------------------<  96  3.5   |  26  |  0.74    Ca    8.7      29 Jun 2025 00:00  Mg     2.1     06-28    TPro  7.6  /  Alb  4.2  /  TBili  1.8[H]  /  DBili  x   /  AST  50[H]  /  ALT  67  /  AlkPhos  61  06-28            ECG/Telemetry: Sinus rhythm
Neurology Follow up note    MARLI [id number]    HPI:   84-year-old female with history of hypertension, hyperlipidemia, IBS presents to the ED status post syncopal event prior to arrival.  Patient states that she has been unsteady on her feet for the past few weeks.  Patient states she went down to her basement today to get cans of tomato purée, walked up the stairs, sat down for a few minutes, stood up to use the bathroom and passed out.  Patient states that she did not feel lightheaded or dizzy prior to syncope.  Patient states she woke up on the floor.  Patient was unsure what happened.  Patient states that now she is feeling better.  Patient does not believe she hit her head however is not sure.  No anticoagulation.  Patient admits that she saw her GI doctor on Thursday for abdominal discomfort and constipation.  Was instructed to take MiraLAX and drink plenty of fluids. Patient recently saw her cardiologist last month, had a stress test and echo which was unremarkable as per patient. Denies fever, chills, headache, dizziness, visual changes, chest pain, sob, abd pain, N/V, UE/LE weakness or paresthesias. (28 Jun 2025 17:01)      Interval History -doing better    Patient is seen, chart was reviewed and case was discussed with the treatment team.  Pt is not in any distress.   Lying on bed comfortably.   No events reported overnight.       Vital Signs Last 24 Hrs  T(C): 36.8 (01 Jul 2025 11:14), Max: 36.9 (30 Jun 2025 20:36)  T(F): 98.3 (01 Jul 2025 11:14), Max: 98.4 (30 Jun 2025 20:36)  HR: 71 (01 Jul 2025 11:14) (68 - 78)  BP: 121/70 (01 Jul 2025 11:14) (121/70 - 152/79)  BP(mean): --  RR: 18 (01 Jul 2025 11:14) (18 - 18)  SpO2: 98% (01 Jul 2025 11:14) (97% - 98%)    Parameters below as of 01 Jul 2025 11:14  Patient On (Oxygen Delivery Method): room air            REVIEW OF SYSTEMS:    Constitutional: No fever, weight loss  Eyes: No eye pain, visual disturbances, or discharge  ENT:  No difficulty hearing, tinnitus, vertigo; No sinus or throat pain  Neck: No pain or stiffness  Respiratory: No cough, wheezing, chills or hemoptysis  Cardiovascular: No chest pain, palpitations, shortness of breath, dizziness or leg swelling  Gastrointestinal: No abdominal or epigastric pain. No nausea, vomiting or hematemesis;  Genitourinary: No dysuria, frequency, hematuria or incontinence  Neurological: No headaches, memory loss, loss of strength, numbness or tremors  Psychiatric: No depression, anxiety, mood swings or difficulty sleeping  Musculoskeletal: No joint pain or swelling; No muscle, back or extremity pain  Skin: No itching, burning, rashes or lesions   Lymph Nodes: No enlarged glands  Endocrine: No heat or cold intolerance;   Allergy and Immunologic: No hives or eczema    On Neurological Examination:    Mental Status - Pt is alert, awake, oriented X3. Follows commands well and able to answer questions appropriately.Mood and affect  normal    Speech -  Normal.    Cranial Nerves - Pupils 3 mm equal and reactive to light, extraocular eye movements intact. Pt has no visual field deficit.  Pt has no  facial asymmetry. Facial sensation is intact.Tongue - is in midline.    Muscle tone - is normal all over. Moves all extremities equally. No asymmetry is seen.      Motor Exam - 5/5 all over, No drift. No shaking or tremors.    Sensory Exam - Pin prick, temperature, joint position and vibration are intact on either side. Pt withdraws all extremities equally on stimulation. No asymmetry seen. No complaints of tingling, numbness.    Gait - Able to stand and walk unassisted.          coordination:    Finger to nose: normal        Deep tendon Reflexes - 2 plus all over.        .    Neck Supple -  Yes.     MEDICATIONS    aspirin enteric coated 325 milliGRAM(s) Oral daily  atorvastatin 40 milliGRAM(s) Oral at bedtime  diltiazem    milliGRAM(s) Oral daily  famotidine    Tablet 20 milliGRAM(s) Oral daily  heparin   Injectable 5000 Unit(s) SubCutaneous every 8 hours  latanoprost 0.005% Ophthalmic Solution 1 Drop(s) Both EYES at bedtime  potassium chloride    Tablet ER 20 milliEquivalent(s) Oral every 2 hours      Allergies    No Known Allergies    Intolerances        LABS:  CBC Full  -  ( 01 Jul 2025 05:45 )  WBC Count : 6.24 K/uL  RBC Count : 4.97 M/uL  Hemoglobin : 10.8 g/dL  Hematocrit : 33.5 %  Platelet Count - Automated : 244 K/uL  Mean Cell Volume : 67.4 fl  Mean Cell Hemoglobin : 21.7 pg  Mean Cell Hemoglobin Concentration : 32.2 g/dL  Auto Neutrophil # : x  Auto Lymphocyte # : x  x    Urinalysis Basic - ( 01 Jul 2025 05:45 )    Color: x / Appearance: x / SG: x / pH: x  Gluc: 87 mg/dL / Ketone: x  / Bili: x / Urobili: x   Blood: x / Protein: x / Nitrite: x   Leuk Esterase: x / RBC: x / WBC x   Sq Epi: x / Non Sq Epi: x / Bacteria: x      07-01    138  |  103  |  11  ----------------------------<  87  3.3[L]   |  30  |  0.52    Ca    9.0      01 Jul 2025 05:45    TPro  6.7  /  Alb  3.6  /  TBili  1.8[H]  /  DBili  x   /  AST  24  /  ALT  52  /  AlkPhos  61  07-01    Hemoglobin A1C:   Lipid Panel 07-01 @ 05:45  Total Cholesterol, Serum 148  LDL --  Triglycerides 136    Vitamin B12     RADIOLOGY    ASSESSMENT AND PLAN:      seen for loc/fall  cw syncope  tiny subacute right medial frontal infarct  nihss -0  no indication for TNK    ON AP/STATIN  DC HOME  DW DR HERNANDEZ  NEURO F/U POT PATIENT   Pain is accessed and addressed.                
neuro cons dict  seen for loc/fall  cw syncope  tiny subacute infarct  asa/statin  echo  carotid doppler 
Chief Complaint: Syncope    Interval Events: No events overnight.    Review of Systems:  General: No fevers, chills, weight gain  Skin: No rashes, color changes  Cardiovascular: No chest pain, orthopnea  Respiratory: No shortness of breath, cough  Gastrointestinal: No nausea, abdominal pain  Genitourinary: No incontinence, pain with urination  Musculoskeletal: No pain, swelling, decreased range of motion  Neurological: No headache, weakness  Psychiatric: No depression, anxiety  Endocrine: No weight gain, increased thirst  All other systems are comprehensively negative.    Physical Exam:  Vital Signs Last 24 Hrs  T(C): 36.6 (30 Jun 2025 05:00), Max: 36.9 (29 Jun 2025 12:45)  T(F): 97.9 (30 Jun 2025 05:00), Max: 98.4 (29 Jun 2025 12:45)  HR: 61 (30 Jun 2025 05:00) (61 - 73)  BP: 128/68 (30 Jun 2025 05:00) (119/63 - 157/72)  BP(mean): --  RR: 18 (30 Jun 2025 05:00) (17 - 18)  SpO2: 98% (30 Jun 2025 05:00) (98% - 98%)  Parameters below as of 30 Jun 2025 05:00  Patient On (Oxygen Delivery Method): room air  General: NAD  HEENT: MMM  Neck: No JVD, no carotid bruit  Lungs: CTAB  CV: RRR, nl S1/S2, no M/R/G  Abdomen: S/NT/ND, +BS  Extremities: No LE edema, no cyanosis  Neuro: AAOx3, non-focal  Skin: No rash    Labs:             06-29    135  |  102  |  12  ----------------------------<  98  3.6   |  25  |  0.65    Ca    8.8      29 Jun 2025 06:50  Mg     2.1     06-28    TPro  6.1  /  Alb  3.4  /  TBili  1.6[H]  /  DBili  x   /  AST  30  /  ALT  46  /  AlkPhos  57  06-29                        10.4   6.31  )-----------( 238      ( 29 Jun 2025 06:50 )             31.5         ECG/Telemetry: Sinus rhythm
Patient is a 84y old  Female who presents with a chief complaint of     INTERVAL HPI/OVERNIGHT EVENTS: noted  pt seen and examined this am   events noted  feels well      Vital Signs Last 24 Hrs  T(C): 36.6 (29 Jun 2025 05:05), Max: 36.9 (28 Jun 2025 11:02)  T(F): 97.9 (29 Jun 2025 05:05), Max: 98.4 (28 Jun 2025 11:02)  HR: 60 (29 Jun 2025 05:05) (60 - 67)  BP: 107/62 (29 Jun 2025 05:05) (107/62 - 144/84)  BP(mean): --  RR: 17 (29 Jun 2025 05:05) (16 - 18)  SpO2: 98% (29 Jun 2025 05:05) (98% - 98%)    Parameters below as of 29 Jun 2025 05:05  Patient On (Oxygen Delivery Method): room air        diltiazem    milliGRAM(s) Oral daily  heparin   Injectable 5000 Unit(s) SubCutaneous every 8 hours  latanoprost 0.005% Ophthalmic Solution 1 Drop(s) Both EYES at bedtime      PHYSICAL EXAM:  GENERAL: NAD,   EYES: conjunctiva and sclera clear  ENMT: Moist mucous membranes  NECK: Supple, No JVD, Normal thyroid  CHEST/LUNG: non labored, cta b/l  HEART: Regular rate and rhythm; No murmurs, rubs, or gallops  ABDOMEN: Soft, Nontender, Nondistended; Bowel sounds present  EXTREMITIES:  2+ Peripheral Pulses, No clubbing, cyanosis, or edema  LYMPH: No lymphadenopathy noted  SKIN: No rashes or lesions    Consultant(s) Notes Reviewed:  [x ] YES  [ ] NO  Care Discussed with Consultants/Other Providers [ x] YES  [ ] NO    LABS:                        10.4   6.31  )-----------( 238      ( 29 Jun 2025 06:50 )             31.5     06-29    135  |  102  |  12  ----------------------------<  98  3.6   |  25  |  0.65    Ca    8.8      29 Jun 2025 06:50  Mg     2.1     06-28    TPro  6.1  /  Alb  3.4  /  TBili  1.6[H]  /  DBili  x   /  AST  30  /  ALT  46  /  AlkPhos  57  06-29      Urinalysis Basic - ( 29 Jun 2025 06:50 )    Color: x / Appearance: x / SG: x / pH: x  Gluc: 98 mg/dL / Ketone: x  / Bili: x / Urobili: x   Blood: x / Protein: x / Nitrite: x   Leuk Esterase: x / RBC: x / WBC x   Sq Epi: x / Non Sq Epi: x / Bacteria: x      CAPILLARY BLOOD GLUCOSE            Urinalysis Basic - ( 29 Jun 2025 06:50 )    Color: x / Appearance: x / SG: x / pH: x  Gluc: 98 mg/dL / Ketone: x  / Bili: x / Urobili: x   Blood: x / Protein: x / Nitrite: x   Leuk Esterase: x / RBC: x / WBC x   Sq Epi: x / Non Sq Epi: x / Bacteria: x          RADIOLOGY & ADDITIONAL TESTS:    Imaging Personally Reviewed:  [x ] YES  [ ] NO
Date of Service 06-30-25 @ 18:17    Patient is a 84y old  Female who presents with a chief complaint of  syncope    INTERVAL /OVERNIGHT EVENTS: non complaint    MEDICATIONS  (STANDING):  aspirin enteric coated 325 milliGRAM(s) Oral daily  diltiazem    milliGRAM(s) Oral daily  famotidine    Tablet 20 milliGRAM(s) Oral daily  heparin   Injectable 5000 Unit(s) SubCutaneous every 8 hours  latanoprost 0.005% Ophthalmic Solution 1 Drop(s) Both EYES at bedtime    MEDICATIONS  (PRN):      Allergies    No Known Allergies    Intolerances        REVIEW OF SYSTEMS:  CONSTITUTIONAL: No fever, weight loss, or fatigue  EYES: No eye pain, visual disturbances, or discharge  ENMT:  No difficulty hearing, tinnitus, vertigo; No sinus or throat pain  NECK: No pain or stiffness  RESPIRATORY: No cough, wheezing, chills or hemoptysis; No shortness of breath  CARDIOVASCULAR: No chest pain, palpitations, dizziness, or leg swelling  GASTROINTESTINAL: No abdominal or epigastric pain. No nausea, vomiting, or hematemesis; No diarrhea or constipation. No melena or hematochezia.  GENITOURINARY: No dysuria, frequency, hematuria, or incontinence  NEUROLOGICAL: No headaches, memory loss, loss of strength, numbness, or tremors  SKIN: No itching, burning, rashes, or lesions   LYMPH NODES: No enlarged glands  ENDOCRINE: No heat or cold intolerance; No hair loss; No polydipsia or polyuria  MUSCULOSKELETAL: No joint pain or swelling; No muscle, back, or extremity pain  PSYCHIATRIC: No depression, anxiety, mood swings, or difficulty sleeping  HEME/LYMPH: No easy bruising, or bleeding gums  ALLERGY AND IMMUNOLOGIC: No hives or eczema    Vital Signs Last 24 Hrs  T(C): 36.9 (30 Jun 2025 11:50), Max: 36.9 (30 Jun 2025 11:50)  T(F): 98.4 (30 Jun 2025 11:50), Max: 98.4 (30 Jun 2025 11:50)  HR: 78 (30 Jun 2025 11:50) (61 - 78)  BP: 147/63 (30 Jun 2025 11:50) (128/68 - 157/72)  BP(mean): --  RR: 18 (30 Jun 2025 11:50) (18 - 18)  SpO2: 98% (30 Jun 2025 11:50) (98% - 98%)    Parameters below as of 30 Jun 2025 11:50  Patient On (Oxygen Delivery Method): room air        PHYSICAL EXAM:  GENERAL: NAD, well-groomed, well-developed  HEAD:  Atraumatic, Normocephalic  EYES: EOMI, PERRLA, conjunctiva and sclera clear  ENMT: No tonsillar erythema, exudates, or enlargement; Moist mucous membranes, Good dentition, No lesions  NECK: Supple, No JVD, Normal thyroid  NERVOUS SYSTEM:  Alert & Oriented X3, Good concentration; Motor Strength 5/5 B/L upper and lower extremities; DTRs 2+ intact and symmetric  CHEST/LUNG: Clear to auscultation bilaterally; No rales, rhonchi, wheezing, or rubs  HEART: Regular rate and rhythm; No murmurs, rubs, or gallops  ABDOMEN: Soft, Nontender, Nondistended; Bowel sounds present  EXTREMITIES:  2+ Peripheral Pulses, No clubbing, cyanosis, or edema  LYMPH: No lymphadenopathy noted  SKIN: No rashes or lesions    LABS:                        10.2   6.81  )-----------( 248      ( 30 Jun 2025 07:50 )             32.3     30 Jun 2025 07:50    135    |  100    |  13     ----------------------------<  105    3.5     |  26     |  0.53     Ca    8.4        30 Jun 2025 07:50    TPro  6.4    /  Alb  3.5    /  TBili  1.6    /  DBili  x      /  AST  25     /  ALT  49     /  AlkPhos  60     30 Jun 2025 07:50      Urinalysis Basic - ( 30 Jun 2025 07:50 )    Color: x / Appearance: x / SG: x / pH: x  Gluc: 105 mg/dL / Ketone: x  / Bili: x / Urobili: x   Blood: x / Protein: x / Nitrite: x   Leuk Esterase: x / RBC: x / WBC x   Sq Epi: x / Non Sq Epi: x / Bacteria: x      CAPILLARY BLOOD GLUCOSE          RADIOLOGY & ADDITIONAL TESTS:    Notes Reviewed:  [x ] YES  [ ] NO    Care Discussed with Consultants/Other Providers [x ] YES  [ ] NO

## 2025-07-01 NOTE — DISCHARGE NOTE PROVIDER - NSDCMRMEDTOKEN_GEN_ALL_CORE_FT
dilTIAZem 300 mg/24 hours oral capsule, extended release: 1 cap(s) orally once a day  latanoprost 0.005% ophthalmic solution: 1 drop(s) to each affected eye once a day (at bedtime)   atorvastatin 40 mg oral tablet: 1 tab(s) orally once a day (at bedtime)  dilTIAZem 300 mg/24 hours oral capsule, extended release: 1 cap(s) orally once a day  Ecotrin Adult Low Strength 81 mg oral delayed release tablet: 1 tab(s) orally once a day  famotidine 20 mg oral tablet: 1 tab(s) orally once a day  latanoprost 0.005% ophthalmic solution: 1 drop(s) to each affected eye once a day (at bedtime)

## 2025-07-01 NOTE — CHART NOTE - NSCHARTNOTEFT_GEN_A_CORE

## 2025-07-01 NOTE — OCCUPATIONAL THERAPY INITIAL EVALUATION ADULT - ADDITIONAL COMMENTS
Patient lives in a split level house with 2 shallow steps on walkway to enter house, 5 stairs with handrail to bedroom and bathroom and additional 8 stairs with handrail to stall shower with grab bars and built-in bench. Patient drives a car. 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 Patient is left hand dominant. Visual skills: WNL's. Patient performed sit to stand and ambulated 30' with no devices independently.

## 2025-07-01 NOTE — DIETITIAN INITIAL EVALUATION ADULT - NUTRITIONGOAL OUTCOME1
serum k 3.4-5.3  on OP basis obtain , fe panel and ferritin level serum k 3.5-5.3 mmol/l  on OP basis obtain  fe panel and ferritin level if not done inhouse

## 2025-07-01 NOTE — DIETITIAN INITIAL EVALUATION ADULT - PERTINENT LABORATORY DATA
07-01    138  |  103  |  11  ----------------------------<  87  3.3[L]   |  30  |  0.52    Ca    9.0      01 Jul 2025 05:45    TPro  6.7  /  Alb  3.6  /  TBili  1.8[H]  /  DBili  x   /  AST  24  /  ALT  52  /  AlkPhos  61  07-01   07-01    138  |  103  |  11  ----------------------------<  87  3.3[L]   |  30  |  0.52    Ca    9.0      01 Jul 2025 05:45    TPro  6.7  /  Alb  3.6  /  TBili  1.8[H]  /  DBili  x   /  AST  24  /  ALT  52  /  AlkPhos  61  07-01    ( 6/28/25) H/H : 12.1/36.1 MCV 65.2  ( 7/1/25) 10.8( L) 33.5( L)  MCV 67.4   fe panel and ferritin level not available

## 2025-07-01 NOTE — OCCUPATIONAL THERAPY INITIAL EVALUATION ADULT - DIAGNOSIS, OT EVAL
Patient with decreased ADL status and impairments with functional mobility. Patient is independent in ADLs and ambulation.

## 2025-07-01 NOTE — DISCHARGE NOTE PROVIDER - CARE PROVIDER_API CALL
Dilia Rhodes  Internal Medicine  [address] [place] [address]  Phone: [phone]  Fax: [phone]  Follow Up Time:     Aida Whiteside  Neurology  [address] [place] [address]  Phone: [phone]  Fax: [phone]  Follow Up Time:     Horacio Holloway  Cardiovascular Disease  [address] [place] [address], [address]  [place] [address]  Phone: [phone]  Fax: [phone]  Follow Up Time:

## 2025-07-14 ENCOUNTER — DOCTOR'S OFFICE (OUTPATIENT)
Facility: LOCATION | Age: 85
Setting detail: OPHTHALMOLOGY
End: 2025-07-14
Payer: MEDICARE

## 2025-07-14 DIAGNOSIS — H43.393: ICD-10-CM

## 2025-07-14 DIAGNOSIS — H35.373: ICD-10-CM

## 2025-07-14 DIAGNOSIS — H30.22: ICD-10-CM

## 2025-07-14 DIAGNOSIS — H35.363: ICD-10-CM

## 2025-07-14 DIAGNOSIS — H35.3131: ICD-10-CM

## 2025-07-14 DIAGNOSIS — H43.813: ICD-10-CM

## 2025-07-14 DIAGNOSIS — H35.342: ICD-10-CM

## 2025-07-14 PROCEDURE — 92134 CPTRZ OPH DX IMG PST SGM RTA: CPT | Performed by: OPHTHALMOLOGY

## 2025-07-14 PROCEDURE — 67028 INJECTION EYE DRUG: CPT | Mod: LT | Performed by: OPHTHALMOLOGY

## 2025-07-14 ASSESSMENT — CONFRONTATIONAL VISUAL FIELD TEST (CVF)
OS_FINDINGS: FULL
OD_FINDINGS: FULL

## 2025-07-14 ASSESSMENT — KERATOMETRY
OS_K1POWER_DIOPTERS: 45.00
OS_K2POWER_DIOPTERS: 45.25
OD_AXISANGLE_DEGREES: 035
OS_AXISANGLE_DEGREES: 083
OD_K1POWER_DIOPTERS: 45.75
OD_K2POWER_DIOPTERS: 46.25

## 2025-07-14 ASSESSMENT — LID POSITION - DERMATOCHALASIS
OD_DERMATOCHALASIS: 1+ 2+
OS_DERMATOCHALASIS: 1+ 2+

## 2025-07-14 ASSESSMENT — VISUAL ACUITY
OS_BCVA: 20/25-2
OD_BCVA: 20/30-2

## 2025-07-14 ASSESSMENT — REFRACTION_AUTOREFRACTION
OS_AXIS: 070
OD_SPHERE: +0.25
OD_CYLINDER: -1.00
OS_CYLINDER: -1.50
OD_AXIS: 104
OS_SPHERE: -0.75

## 2025-07-14 ASSESSMENT — SUPERFICIAL PUNCTATE KERATITIS (SPK)
OD_SPK: T
OS_SPK: ABSENT

## 2025-08-28 ENCOUNTER — DOCTOR'S OFFICE (OUTPATIENT)
Facility: LOCATION | Age: 85
Setting detail: OPHTHALMOLOGY
End: 2025-08-28
Payer: MEDICARE

## 2025-08-28 DIAGNOSIS — H43.813: ICD-10-CM

## 2025-08-28 DIAGNOSIS — H43.12: ICD-10-CM

## 2025-08-28 DIAGNOSIS — H35.342: ICD-10-CM

## 2025-08-28 DIAGNOSIS — H40.042: ICD-10-CM

## 2025-08-28 DIAGNOSIS — H35.363: ICD-10-CM

## 2025-08-28 DIAGNOSIS — H30.22: ICD-10-CM

## 2025-08-28 DIAGNOSIS — H43.393: ICD-10-CM

## 2025-08-28 DIAGNOSIS — H35.373: ICD-10-CM

## 2025-08-28 DIAGNOSIS — H35.3131: ICD-10-CM

## 2025-08-28 PROCEDURE — 92134 CPTRZ OPH DX IMG PST SGM RTA: CPT | Performed by: OPHTHALMOLOGY

## 2025-08-28 PROCEDURE — 99213 OFFICE O/P EST LOW 20 MIN: CPT | Performed by: OPHTHALMOLOGY

## 2025-08-28 ASSESSMENT — KERATOMETRY
OD_K2POWER_DIOPTERS: 46.25
OD_AXISANGLE_DEGREES: 035
OS_K2POWER_DIOPTERS: 45.25
OD_K1POWER_DIOPTERS: 45.75
OS_K1POWER_DIOPTERS: 45.00
OS_AXISANGLE_DEGREES: 083

## 2025-08-28 ASSESSMENT — LID POSITION - DERMATOCHALASIS
OD_DERMATOCHALASIS: 1+ 2+
OS_DERMATOCHALASIS: 1+ 2+

## 2025-08-28 ASSESSMENT — REFRACTION_AUTOREFRACTION
OS_CYLINDER: -1.50
OD_CYLINDER: -1.00
OS_AXIS: 070
OS_SPHERE: -0.75
OD_SPHERE: +0.25
OD_AXIS: 104

## 2025-08-28 ASSESSMENT — SUPERFICIAL PUNCTATE KERATITIS (SPK)
OS_SPK: ABSENT
OD_SPK: T

## 2025-08-28 ASSESSMENT — VISUAL ACUITY
OS_BCVA: 20/30
OD_BCVA: 20/25+2